# Patient Record
Sex: FEMALE | Race: WHITE | Employment: UNEMPLOYED | URBAN - METROPOLITAN AREA
[De-identification: names, ages, dates, MRNs, and addresses within clinical notes are randomized per-mention and may not be internally consistent; named-entity substitution may affect disease eponyms.]

---

## 2017-02-14 ENCOUNTER — TELEPHONE (OUTPATIENT)
Dept: INTERNAL MEDICINE CLINIC | Facility: CLINIC | Age: 57
End: 2017-02-14

## 2017-02-14 DIAGNOSIS — Z13.228 SCREENING FOR ENDOCRINE, METABOLIC AND IMMUNITY DISORDER: ICD-10-CM

## 2017-02-14 DIAGNOSIS — Z13.0 SCREENING FOR DISORDER OF BLOOD AND BLOOD-FORMING ORGANS: Primary | ICD-10-CM

## 2017-02-14 DIAGNOSIS — Z13.0 SCREENING FOR ENDOCRINE, METABOLIC AND IMMUNITY DISORDER: ICD-10-CM

## 2017-02-14 DIAGNOSIS — Z13.29 SCREENING FOR ENDOCRINE, METABOLIC AND IMMUNITY DISORDER: ICD-10-CM

## 2017-02-14 DIAGNOSIS — Z13.220 SCREENING FOR LIPID DISORDERS: ICD-10-CM

## 2017-02-14 DIAGNOSIS — Z13.29 SCREENING FOR THYROID DISORDER: ICD-10-CM

## 2017-02-17 RX ORDER — LOSARTAN POTASSIUM AND HYDROCHLOROTHIAZIDE 12.5; 5 MG/1; MG/1
TABLET ORAL
Qty: 90 TABLET | Refills: 0 | Status: SHIPPED | OUTPATIENT
Start: 2017-02-17 | End: 2017-05-22

## 2017-02-20 ENCOUNTER — LAB ENCOUNTER (OUTPATIENT)
Dept: LAB | Age: 57
End: 2017-02-20
Attending: NURSE PRACTITIONER
Payer: COMMERCIAL

## 2017-02-20 DIAGNOSIS — Z13.0 SCREENING FOR ENDOCRINE, METABOLIC AND IMMUNITY DISORDER: ICD-10-CM

## 2017-02-20 DIAGNOSIS — Z13.29 SCREENING FOR THYROID DISORDER: ICD-10-CM

## 2017-02-20 DIAGNOSIS — Z13.220 SCREENING FOR LIPID DISORDERS: ICD-10-CM

## 2017-02-20 DIAGNOSIS — Z13.29 SCREENING FOR ENDOCRINE, METABOLIC AND IMMUNITY DISORDER: ICD-10-CM

## 2017-02-20 DIAGNOSIS — Z13.0 SCREENING FOR DISORDER OF BLOOD AND BLOOD-FORMING ORGANS: ICD-10-CM

## 2017-02-20 DIAGNOSIS — Z13.228 SCREENING FOR ENDOCRINE, METABOLIC AND IMMUNITY DISORDER: ICD-10-CM

## 2017-02-20 LAB
ALBUMIN SERPL-MCNC: 3.6 G/DL (ref 3.5–4.8)
ALP LIVER SERPL-CCNC: 59 U/L (ref 46–118)
ALT SERPL-CCNC: 28 U/L (ref 14–54)
AST SERPL-CCNC: 27 U/L (ref 15–41)
BASOPHILS # BLD AUTO: 0.06 X10(3) UL (ref 0–0.1)
BASOPHILS NFR BLD AUTO: 1.1 %
BILIRUB SERPL-MCNC: 0.6 MG/DL (ref 0.1–2)
BUN BLD-MCNC: 15 MG/DL (ref 8–20)
CALCIUM BLD-MCNC: 8.5 MG/DL (ref 8.3–10.3)
CHLORIDE: 106 MMOL/L (ref 101–111)
CHOLEST SMN-MCNC: 151 MG/DL (ref ?–200)
CO2: 29 MMOL/L (ref 22–32)
CREAT BLD-MCNC: 0.81 MG/DL (ref 0.55–1.02)
EOSINOPHIL # BLD AUTO: 0.09 X10(3) UL (ref 0–0.3)
EOSINOPHIL NFR BLD AUTO: 1.7 %
ERYTHROCYTE [DISTWIDTH] IN BLOOD BY AUTOMATED COUNT: 12.4 % (ref 11.5–16)
GLUCOSE BLD-MCNC: 96 MG/DL (ref 70–99)
HCT VFR BLD AUTO: 41.3 % (ref 34–50)
HDLC SERPL-MCNC: 56 MG/DL (ref 45–?)
HDLC SERPL: 2.7 {RATIO} (ref ?–4.44)
HGB BLD-MCNC: 14.7 G/DL (ref 12–16)
IMMATURE GRANULOCYTE COUNT: 0.01 X10(3) UL (ref 0–1)
IMMATURE GRANULOCYTE RATIO %: 0.2 %
LDLC SERPL CALC-MCNC: 63 MG/DL (ref ?–130)
LYMPHOCYTES # BLD AUTO: 1.61 X10(3) UL (ref 0.9–4)
LYMPHOCYTES NFR BLD AUTO: 29.7 %
M PROTEIN MFR SERPL ELPH: 7 G/DL (ref 6.1–8.3)
MCH RBC QN AUTO: 30.9 PG (ref 27–33.2)
MCHC RBC AUTO-ENTMCNC: 35.6 G/DL (ref 31–37)
MCV RBC AUTO: 86.9 FL (ref 81–100)
MONOCYTES # BLD AUTO: 0.42 X10(3) UL (ref 0.1–0.6)
MONOCYTES NFR BLD AUTO: 7.7 %
NEUTROPHIL ABS PRELIM: 3.23 X10 (3) UL (ref 1.3–6.7)
NEUTROPHILS # BLD AUTO: 3.23 X10(3) UL (ref 1.3–6.7)
NEUTROPHILS NFR BLD AUTO: 59.6 %
NONHDLC SERPL-MCNC: 95 MG/DL (ref ?–130)
PLATELET # BLD AUTO: 218 10(3)UL (ref 150–450)
POTASSIUM SERPL-SCNC: 4 MMOL/L (ref 3.6–5.1)
RBC # BLD AUTO: 4.75 X10(6)UL (ref 3.8–5.1)
RED CELL DISTRIBUTION WIDTH-SD: 39.1 FL (ref 35.1–46.3)
SODIUM SERPL-SCNC: 142 MMOL/L (ref 136–144)
TRIGLYCERIDES: 161 MG/DL (ref ?–150)
TSI SER-ACNC: 1.29 MIU/ML (ref 0.35–5.5)
VLDL: 32 MG/DL (ref 5–40)
WBC # BLD AUTO: 5.4 X10(3) UL (ref 4–13)

## 2017-02-20 PROCEDURE — 80053 COMPREHEN METABOLIC PANEL: CPT

## 2017-02-20 PROCEDURE — 85025 COMPLETE CBC W/AUTO DIFF WBC: CPT

## 2017-02-20 PROCEDURE — 80061 LIPID PANEL: CPT

## 2017-02-20 PROCEDURE — 36415 COLL VENOUS BLD VENIPUNCTURE: CPT

## 2017-02-20 PROCEDURE — 84443 ASSAY THYROID STIM HORMONE: CPT

## 2017-02-28 ENCOUNTER — OFFICE VISIT (OUTPATIENT)
Dept: INTERNAL MEDICINE CLINIC | Facility: CLINIC | Age: 57
End: 2017-02-28

## 2017-02-28 VITALS
HEIGHT: 67 IN | BODY MASS INDEX: 33.43 KG/M2 | WEIGHT: 213 LBS | SYSTOLIC BLOOD PRESSURE: 126 MMHG | DIASTOLIC BLOOD PRESSURE: 80 MMHG | HEART RATE: 76 BPM | TEMPERATURE: 98 F | RESPIRATION RATE: 17 BRPM

## 2017-02-28 DIAGNOSIS — Z00.00 ROUTINE GENERAL MEDICAL EXAMINATION AT A HEALTH CARE FACILITY: Primary | ICD-10-CM

## 2017-02-28 DIAGNOSIS — M25.551 RIGHT HIP PAIN: ICD-10-CM

## 2017-02-28 DIAGNOSIS — I10 ESSENTIAL HYPERTENSION: ICD-10-CM

## 2017-02-28 PROCEDURE — 99396 PREV VISIT EST AGE 40-64: CPT | Performed by: NURSE PRACTITIONER

## 2017-02-28 PROCEDURE — 90715 TDAP VACCINE 7 YRS/> IM: CPT | Performed by: NURSE PRACTITIONER

## 2017-02-28 PROCEDURE — 90471 IMMUNIZATION ADMIN: CPT | Performed by: NURSE PRACTITIONER

## 2017-02-28 RX ORDER — MINOCYCLINE HYDROCHLORIDE 100 MG/1
100 CAPSULE ORAL 2 TIMES DAILY
Refills: 5 | COMMUNITY
Start: 2017-02-14 | End: 2018-03-05

## 2017-02-28 RX ORDER — NAPROXEN 500 MG/1
500 TABLET ORAL 2 TIMES DAILY
Qty: 60 TABLET | Refills: 0 | Status: SHIPPED | OUTPATIENT
Start: 2017-02-28 | End: 2017-06-19 | Stop reason: ALTCHOICE

## 2017-02-28 NOTE — PROGRESS NOTES
Jo Ann Crowe is a 64year old female who presents for a complete physical exam:       Patient complains of:    HTN   bp to goal.  Losartan hct 50/12.5mg. She does note some increased frequency  She defers changing her med as her bp is controlled. PEA SIZE AMOUNT ON SKIN UTD NIGHTLY Disp:  Rfl: 6      Past Medical History   Diagnosis Date   • Cough    • Acute pharyngitis    • Personal history of pneumonia (recurrent)    • Unspecified essential hypertension    • High blood pressure           Past Maryam Jones      History of dysplasia:    Menstrual Cycle:          LMP:   Symptoms:   Sexually Active:  Yes   Mammogram: aug  Bone Density:      Osteoperosis Prevention:    Osteoperosis Prevention was discussed.   Reviewed Calcium, Vitamin D supplementation and health care facility  (primary encounter diagnosis)  Essential hypertension   Cont losartan hct      Orders Placed This Encounter  TdaP (Adacel, Boostrix) (05737) (DX V06.1/Z23)    Meds & Refills for this Visit:  Signed Prescriptions Disp Refills    naprox

## 2017-03-22 PROBLEM — M25.551 HIP PAIN, ACUTE, RIGHT: Status: ACTIVE | Noted: 2017-03-22

## 2017-05-22 RX ORDER — LOSARTAN POTASSIUM AND HYDROCHLOROTHIAZIDE 12.5; 5 MG/1; MG/1
TABLET ORAL
Qty: 90 TABLET | Refills: 0 | Status: SHIPPED | OUTPATIENT
Start: 2017-05-22 | End: 2017-08-23

## 2017-06-19 ENCOUNTER — OFFICE VISIT (OUTPATIENT)
Dept: INTERNAL MEDICINE CLINIC | Facility: CLINIC | Age: 57
End: 2017-06-19

## 2017-06-19 VITALS
DIASTOLIC BLOOD PRESSURE: 82 MMHG | SYSTOLIC BLOOD PRESSURE: 134 MMHG | BODY MASS INDEX: 33.27 KG/M2 | RESPIRATION RATE: 16 BRPM | TEMPERATURE: 98 F | HEIGHT: 67 IN | HEART RATE: 72 BPM | WEIGHT: 212 LBS

## 2017-06-19 DIAGNOSIS — J06.9 ACUTE URI: Primary | ICD-10-CM

## 2017-06-19 PROCEDURE — 99213 OFFICE O/P EST LOW 20 MIN: CPT | Performed by: NURSE PRACTITIONER

## 2017-06-19 RX ORDER — AMOXICILLIN AND CLAVULANATE POTASSIUM 875; 125 MG/1; MG/1
1 TABLET, FILM COATED ORAL 2 TIMES DAILY
Qty: 20 TABLET | Refills: 0 | Status: SHIPPED | OUTPATIENT
Start: 2017-06-19 | End: 2017-06-29

## 2017-06-19 NOTE — PATIENT INSTRUCTIONS
Gargle with warm salt water solution 3-5 times daily. Dissolve 1/2 teaspoon salt in half cup of warm tap water. Gargle and spit. Use a humidifier in your room at night.      I highly recommend using a saline nasal wash device such as: SinuCleanse Nasal

## 2017-06-19 NOTE — PROGRESS NOTES
Patient presents with:  Cough/URI: x 4 days- Pt states she has non productive cough w/ chills- pt denies any NVD       HPI:  Presents with approx 1 week history of cough without production, sore throat, sinus congestion, head congestion, nasal drainage and rhonchi or rales   Skin: Skin is warm and dry. No rash noted. No erythema. No pallor. A/P:    Acute uri  (primary encounter diagnosis)- Augmentin. Nasal Sinus saline rinses, warm salt water gargles.  Supportive care-increased fluids/rest    Instructed

## 2017-08-23 RX ORDER — LOSARTAN POTASSIUM AND HYDROCHLOROTHIAZIDE 12.5; 5 MG/1; MG/1
TABLET ORAL
Qty: 90 TABLET | Refills: 0 | Status: SHIPPED | OUTPATIENT
Start: 2017-08-23 | End: 2017-11-27

## 2017-11-27 RX ORDER — LOSARTAN POTASSIUM AND HYDROCHLOROTHIAZIDE 12.5; 5 MG/1; MG/1
TABLET ORAL
Qty: 90 TABLET | Refills: 0 | Status: SHIPPED | OUTPATIENT
Start: 2017-11-27 | End: 2018-02-19

## 2017-11-29 ENCOUNTER — OFFICE VISIT (OUTPATIENT)
Dept: INTERNAL MEDICINE CLINIC | Facility: CLINIC | Age: 57
End: 2017-11-29

## 2017-11-29 VITALS
BODY MASS INDEX: 34.84 KG/M2 | TEMPERATURE: 98 F | DIASTOLIC BLOOD PRESSURE: 78 MMHG | WEIGHT: 222 LBS | HEART RATE: 82 BPM | HEIGHT: 67 IN | OXYGEN SATURATION: 99 % | SYSTOLIC BLOOD PRESSURE: 120 MMHG | RESPIRATION RATE: 16 BRPM

## 2017-11-29 DIAGNOSIS — J02.9 PHARYNGITIS, UNSPECIFIED ETIOLOGY: ICD-10-CM

## 2017-11-29 DIAGNOSIS — J06.9 ACUTE URI: Primary | ICD-10-CM

## 2017-11-29 PROCEDURE — 99213 OFFICE O/P EST LOW 20 MIN: CPT | Performed by: NURSE PRACTITIONER

## 2017-11-29 RX ORDER — AMOXICILLIN AND CLAVULANATE POTASSIUM 875; 125 MG/1; MG/1
1 TABLET, FILM COATED ORAL 2 TIMES DAILY
Qty: 20 TABLET | Refills: 0 | Status: SHIPPED | OUTPATIENT
Start: 2017-11-29 | End: 2017-12-09

## 2017-11-29 RX ORDER — FLUTICASONE PROPIONATE 50 MCG
1 SPRAY, SUSPENSION (ML) NASAL 2 TIMES DAILY
Qty: 1 BOTTLE | Refills: 1 | Status: SHIPPED | OUTPATIENT
Start: 2017-11-29

## 2017-11-29 NOTE — PROGRESS NOTES
Marika Purcell is a 62year old female. Patient presents with:  Sinus Problem: exam rm 11  Sore Throat      HPI:   Presents with swollen throat and sinus congestion. Started Sunday with throat fullness. Woke from sleep. No SOB.   Shellfish allergy but with exertion, no cough  CARDIOVASCULAR: denies chest pain on exertion, no palpatations  GI: denies abdominal pain and denies heartburn, no diarrhea or constipation  MUSCULOSKELETAL:  No arthralgias or myalgias  NEURO: denies headaches,     EXAM:   /

## 2018-02-07 ENCOUNTER — TELEPHONE (OUTPATIENT)
Dept: INTERNAL MEDICINE CLINIC | Facility: CLINIC | Age: 58
End: 2018-02-07

## 2018-02-07 DIAGNOSIS — Z00.00 LABORATORY EXAMINATION ORDERED AS PART OF A COMPLETE PHYSICAL EXAMINATION: Primary | ICD-10-CM

## 2018-02-19 RX ORDER — LOSARTAN POTASSIUM AND HYDROCHLOROTHIAZIDE 12.5; 5 MG/1; MG/1
TABLET ORAL
Qty: 90 TABLET | Refills: 0 | Status: SHIPPED | OUTPATIENT
Start: 2018-02-19 | End: 2018-03-05

## 2018-02-26 ENCOUNTER — LAB ENCOUNTER (OUTPATIENT)
Dept: LAB | Age: 58
End: 2018-02-26
Attending: NURSE PRACTITIONER
Payer: COMMERCIAL

## 2018-02-26 DIAGNOSIS — Z00.00 LABORATORY EXAMINATION ORDERED AS PART OF A COMPLETE PHYSICAL EXAMINATION: ICD-10-CM

## 2018-02-26 LAB
ALBUMIN SERPL-MCNC: 3.6 G/DL (ref 3.5–4.8)
ALP LIVER SERPL-CCNC: 62 U/L (ref 46–118)
ALT SERPL-CCNC: 33 U/L (ref 14–54)
AST SERPL-CCNC: 23 U/L (ref 15–41)
BASOPHILS # BLD AUTO: 0.05 X10(3) UL (ref 0–0.1)
BASOPHILS NFR BLD AUTO: 1 %
BILIRUB SERPL-MCNC: 0.6 MG/DL (ref 0.1–2)
BUN BLD-MCNC: 11 MG/DL (ref 8–20)
CALCIUM BLD-MCNC: 8.9 MG/DL (ref 8.3–10.3)
CHLORIDE: 104 MMOL/L (ref 101–111)
CHOLEST SMN-MCNC: 153 MG/DL (ref ?–200)
CO2: 29 MMOL/L (ref 22–32)
CREAT BLD-MCNC: 0.83 MG/DL (ref 0.55–1.02)
EOSINOPHIL # BLD AUTO: 0.11 X10(3) UL (ref 0–0.3)
EOSINOPHIL NFR BLD AUTO: 2.2 %
ERYTHROCYTE [DISTWIDTH] IN BLOOD BY AUTOMATED COUNT: 12.8 % (ref 11.5–16)
GLUCOSE BLD-MCNC: 99 MG/DL (ref 70–99)
HCT VFR BLD AUTO: 41.6 % (ref 34–50)
HDLC SERPL-MCNC: 45 MG/DL (ref 45–?)
HDLC SERPL: 3.4 {RATIO} (ref ?–4.44)
HGB BLD-MCNC: 14.1 G/DL (ref 12–16)
IMMATURE GRANULOCYTE COUNT: 0.01 X10(3) UL (ref 0–1)
IMMATURE GRANULOCYTE RATIO %: 0.2 %
LDLC SERPL CALC-MCNC: 77 MG/DL (ref ?–130)
LYMPHOCYTES # BLD AUTO: 1.24 X10(3) UL (ref 0.9–4)
LYMPHOCYTES NFR BLD AUTO: 25.1 %
M PROTEIN MFR SERPL ELPH: 7 G/DL (ref 6.1–8.3)
MCH RBC QN AUTO: 29.6 PG (ref 27–33.2)
MCHC RBC AUTO-ENTMCNC: 33.9 G/DL (ref 31–37)
MCV RBC AUTO: 87.4 FL (ref 81–100)
MONOCYTES # BLD AUTO: 0.41 X10(3) UL (ref 0.1–1)
MONOCYTES NFR BLD AUTO: 8.3 %
NEUTROPHIL ABS PRELIM: 3.12 X10 (3) UL (ref 1.3–6.7)
NEUTROPHILS # BLD AUTO: 3.12 X10(3) UL (ref 1.3–6.7)
NEUTROPHILS NFR BLD AUTO: 63.2 %
NONHDLC SERPL-MCNC: 108 MG/DL (ref ?–130)
PLATELET # BLD AUTO: 217 10(3)UL (ref 150–450)
POTASSIUM SERPL-SCNC: 4.3 MMOL/L (ref 3.6–5.1)
RBC # BLD AUTO: 4.76 X10(6)UL (ref 3.8–5.1)
RED CELL DISTRIBUTION WIDTH-SD: 40.7 FL (ref 35.1–46.3)
SODIUM SERPL-SCNC: 141 MMOL/L (ref 136–144)
TRIGL SERPL-MCNC: 156 MG/DL (ref ?–150)
TSI SER-ACNC: 1.32 MIU/ML (ref 0.35–5.5)
VLDLC SERPL CALC-MCNC: 31 MG/DL (ref 5–40)
WBC # BLD AUTO: 4.9 X10(3) UL (ref 4–13)

## 2018-02-26 PROCEDURE — 80061 LIPID PANEL: CPT | Performed by: NURSE PRACTITIONER

## 2018-02-26 PROCEDURE — 80050 GENERAL HEALTH PANEL: CPT | Performed by: NURSE PRACTITIONER

## 2018-03-05 ENCOUNTER — OFFICE VISIT (OUTPATIENT)
Dept: INTERNAL MEDICINE CLINIC | Facility: CLINIC | Age: 58
End: 2018-03-05

## 2018-03-05 VITALS
TEMPERATURE: 98 F | HEART RATE: 80 BPM | WEIGHT: 217 LBS | RESPIRATION RATE: 16 BRPM | DIASTOLIC BLOOD PRESSURE: 70 MMHG | SYSTOLIC BLOOD PRESSURE: 124 MMHG | HEIGHT: 67 IN | BODY MASS INDEX: 34.06 KG/M2

## 2018-03-05 DIAGNOSIS — I10 ESSENTIAL HYPERTENSION: ICD-10-CM

## 2018-03-05 DIAGNOSIS — E04.2 MULTIPLE THYROID NODULES: ICD-10-CM

## 2018-03-05 DIAGNOSIS — L71.9 ROSACEA: ICD-10-CM

## 2018-03-05 DIAGNOSIS — R35.0 URINARY FREQUENCY: ICD-10-CM

## 2018-03-05 DIAGNOSIS — M62.89 PELVIC FLOOR DYSFUNCTION: ICD-10-CM

## 2018-03-05 DIAGNOSIS — Z00.00 ROUTINE GENERAL MEDICAL EXAMINATION AT A HEALTH CARE FACILITY: Primary | ICD-10-CM

## 2018-03-05 LAB
APPEARANCE: CLEAR
BILIRUBIN: NEGATIVE
GLUCOSE (URINE DIPSTICK): NEGATIVE MG/DL
LEUKOCYTES: NEGATIVE
MULTISTIX LOT#: NORMAL NUMERIC
NITRITE, URINE: NEGATIVE
OCCULT BLOOD: NEGATIVE
PH, URINE: 6.5 (ref 4.5–8)
PROTEIN (URINE DIPSTICK): NEGATIVE MG/DL
SPECIFIC GRAVITY: 1.02 (ref 1–1.03)
URINE-COLOR: YELLOW
UROBILINOGEN,SEMI-QN: 0.2 MG/DL (ref 0–1.9)

## 2018-03-05 PROCEDURE — 81003 URINALYSIS AUTO W/O SCOPE: CPT | Performed by: NURSE PRACTITIONER

## 2018-03-05 PROCEDURE — 99396 PREV VISIT EST AGE 40-64: CPT | Performed by: NURSE PRACTITIONER

## 2018-03-05 RX ORDER — LOSARTAN POTASSIUM AND HYDROCHLOROTHIAZIDE 12.5; 5 MG/1; MG/1
1 TABLET ORAL
Qty: 90 TABLET | Refills: 3 | Status: SHIPPED | OUTPATIENT
Start: 2018-03-05 | End: 2018-06-26

## 2018-03-05 RX ORDER — MINOCYCLINE HYDROCHLORIDE 100 MG/1
100 CAPSULE ORAL 2 TIMES DAILY
Qty: 180 CAPSULE | Refills: 3 | Status: SHIPPED | OUTPATIENT
Start: 2018-03-05 | End: 2019-06-12

## 2018-03-05 NOTE — PROGRESS NOTES
Adalberto Dance is a 62year old female who presents for a complete physical exam:       Patient complains of:    HTN  Losartan HCT  Stable bp  Labs reviewed. Rosacea:  Minocycline per derm   She is requesting refill.        Urinary frequency   Worseni Past Surgical History:  7/11/2017: COLONOSCOPY N/A      Comment: Procedure: COLONOSCOPY, POSSIBLE BIOPSY,                POSSIBLE POLYPECTOMY 69023;  Surgeon: Jeet Lane MD;  Location: [address] [place] [address]  7/27/ History of dysplasia:      Mammogram: done   Bone Density:  Per Dr Robbie De Paz Prevention:    Osteoperosis Prevention was discussed. Reviewed Calcium, Vitamin D supplementation and Weight Bearing Exercises.     Patient is performing weight beari ordered. No orders of the defined types were placed in this encounter. Meds & Refills for this Visit:  Signed Prescriptions Disp Refills    Losartan Potassium-HCTZ 50-12.5 MG Oral Tab 90 tablet 3      Sig: Take 1 tablet by mouth once daily.

## 2018-06-18 ENCOUNTER — TELEPHONE (OUTPATIENT)
Dept: INTERNAL MEDICINE CLINIC | Facility: CLINIC | Age: 58
End: 2018-06-18

## 2018-06-18 NOTE — TELEPHONE ENCOUNTER
Patient has been having head since Saturday. This somewhat went away and then today came back with some severity. Patient was doing things around the house and had to lay down because it pretty bad.   Please advise

## 2018-06-18 NOTE — TELEPHONE ENCOUNTER
Pt stating since Saturday experiencing \"head pain\". Has healed cut on forehead that has scabbed over. No known head injury or fall. Keeping hydrated. Subsided yesterday and returned today. No N/V. No dizziness. No vision changes. No eye pain.  No chest pa

## 2018-06-19 ENCOUNTER — OFFICE VISIT (OUTPATIENT)
Dept: INTERNAL MEDICINE CLINIC | Facility: CLINIC | Age: 58
End: 2018-06-19

## 2018-06-19 ENCOUNTER — HOSPITAL ENCOUNTER (OUTPATIENT)
Dept: CT IMAGING | Age: 58
Discharge: HOME | End: 2018-06-19
Attending: NURSE PRACTITIONER
Payer: COMMERCIAL

## 2018-06-19 ENCOUNTER — APPOINTMENT (OUTPATIENT)
Dept: LAB | Age: 58
End: 2018-06-19
Attending: NURSE PRACTITIONER
Payer: COMMERCIAL

## 2018-06-19 VITALS
BODY MASS INDEX: 35 KG/M2 | TEMPERATURE: 98 F | RESPIRATION RATE: 14 BRPM | WEIGHT: 223 LBS | HEART RATE: 68 BPM | SYSTOLIC BLOOD PRESSURE: 140 MMHG | HEIGHT: 67 IN | DIASTOLIC BLOOD PRESSURE: 88 MMHG

## 2018-06-19 DIAGNOSIS — I10 ESSENTIAL HYPERTENSION: ICD-10-CM

## 2018-06-19 DIAGNOSIS — R51.9 NEW ONSET HEADACHE: ICD-10-CM

## 2018-06-19 DIAGNOSIS — R51.9 NEW ONSET HEADACHE: Primary | ICD-10-CM

## 2018-06-19 PROBLEM — G40.909 SEIZURE DISORDER (HCC): Status: ACTIVE | Noted: 2018-06-19

## 2018-06-19 PROBLEM — G43.909 MIGRAINES: Status: ACTIVE | Noted: 2018-06-19

## 2018-06-19 PROCEDURE — 70450 CT HEAD/BRAIN W/O DYE: CPT | Performed by: NURSE PRACTITIONER

## 2018-06-19 PROCEDURE — 99214 OFFICE O/P EST MOD 30 MIN: CPT | Performed by: NURSE PRACTITIONER

## 2018-06-19 PROCEDURE — 36415 COLL VENOUS BLD VENIPUNCTURE: CPT | Performed by: NURSE PRACTITIONER

## 2018-06-19 PROCEDURE — 85652 RBC SED RATE AUTOMATED: CPT | Performed by: NURSE PRACTITIONER

## 2018-06-19 PROCEDURE — 86140 C-REACTIVE PROTEIN: CPT | Performed by: NURSE PRACTITIONER

## 2018-06-19 PROCEDURE — 80053 COMPREHEN METABOLIC PANEL: CPT | Performed by: NURSE PRACTITIONER

## 2018-06-19 RX ORDER — HYDROCODONE BITARTRATE AND ACETAMINOPHEN 5; 325 MG/1; MG/1
1 TABLET ORAL 3 TIMES DAILY PRN
Qty: 20 TABLET | Refills: 0 | Status: SHIPPED | OUTPATIENT
Start: 2018-06-19 | End: 2018-06-26

## 2018-06-19 RX ORDER — LOSARTAN POTASSIUM 50 MG/1
50 TABLET ORAL DAILY
Qty: 30 TABLET | Refills: 0 | Status: SHIPPED | OUTPATIENT
Start: 2018-06-19 | End: 2018-06-26

## 2018-06-19 NOTE — PROGRESS NOTES
Jo Ann Crowe is a 62year old female. Patient presents with: Other: AB RM 10 Patient states having right side head pain on and off X 4 days, not due to injury       HPI:     Presents for eval of stabbing pain right side of head.    Started Saturday am. Alcohol use: Yes           0.0 - 0.6 oz/week     Standard drinks or equivalent: 0 - 1 per week     Comment: Cage done 6/19/2018    Family History   Problem Relation Age of Onset   • Cancer Mother      breast   • Breast Cancer Mother 72   • Heart Disease HYDROcodone-acetaminophen (NORCO) 5-325 MG Oral Tab 20 tablet 0      Sig: Take 1 tablet by mouth 3 (three) times daily as needed for Pain. Imaging & Consults:  CT BRAIN OR HEAD (29187)    No Follow-up on file.   There are no Patient Instructions o

## 2018-06-26 ENCOUNTER — OFFICE VISIT (OUTPATIENT)
Dept: INTERNAL MEDICINE CLINIC | Facility: CLINIC | Age: 58
End: 2018-06-26

## 2018-06-26 VITALS
TEMPERATURE: 98 F | DIASTOLIC BLOOD PRESSURE: 80 MMHG | RESPIRATION RATE: 12 BRPM | BODY MASS INDEX: 34.44 KG/M2 | SYSTOLIC BLOOD PRESSURE: 126 MMHG | HEART RATE: 72 BPM | WEIGHT: 222 LBS | HEIGHT: 67.5 IN

## 2018-06-26 DIAGNOSIS — I10 ESSENTIAL HYPERTENSION: ICD-10-CM

## 2018-06-26 DIAGNOSIS — R51.9 NEW ONSET HEADACHE: Primary | ICD-10-CM

## 2018-06-26 PROBLEM — G40.909 SEIZURE DISORDER (HCC): Status: RESOLVED | Noted: 2018-06-19 | Resolved: 2018-06-26

## 2018-06-26 PROCEDURE — 99213 OFFICE O/P EST LOW 20 MIN: CPT | Performed by: NURSE PRACTITIONER

## 2018-06-26 RX ORDER — LOSARTAN POTASSIUM AND HYDROCHLOROTHIAZIDE 12.5; 1 MG/1; MG/1
1 TABLET ORAL DAILY
Qty: 90 TABLET | Refills: 2 | Status: SHIPPED | OUTPATIENT
Start: 2018-06-26 | End: 2019-05-29

## 2018-06-26 NOTE — PROGRESS NOTES
Sofie Sullivan is a 62year old female. Patient presents with: Follow - Up: on headaches and BP. LB-room 11      HPI:   Here for follow up headaches and bp. Seen last week. Stabbing headaches. bp borderline high. Stat CT brain negative.    Her headac HEALTH: feels well otherwise  RESPIRATORY: denies shortness of breath with exertion, no cough  CARDIOVASCULAR: denies chest pain on exertion, no palpatations  GI: denies abdominal pain and denies heartburn, no diarrhea or constipation  MUSCULOSKELETAL:  No

## 2018-12-04 PROCEDURE — 87624 HPV HI-RISK TYP POOLED RSLT: CPT | Performed by: OBSTETRICS & GYNECOLOGY

## 2018-12-04 PROCEDURE — 88175 CYTOPATH C/V AUTO FLUID REDO: CPT | Performed by: OBSTETRICS & GYNECOLOGY

## 2019-05-10 ENCOUNTER — TELEPHONE (OUTPATIENT)
Dept: INTERNAL MEDICINE CLINIC | Facility: CLINIC | Age: 59
End: 2019-05-10

## 2019-05-10 DIAGNOSIS — Z13.29 SCREENING FOR THYROID DISORDER: ICD-10-CM

## 2019-05-10 DIAGNOSIS — Z13.0 SCREENING FOR DISORDER OF BLOOD AND BLOOD-FORMING ORGANS: ICD-10-CM

## 2019-05-10 DIAGNOSIS — Z13.220 SCREENING FOR LIPOID DISORDERS: ICD-10-CM

## 2019-05-10 DIAGNOSIS — Z13.228 SCREENING FOR METABOLIC DISORDER: ICD-10-CM

## 2019-05-10 DIAGNOSIS — Z00.00 ROUTINE GENERAL MEDICAL EXAMINATION AT A HEALTH CARE FACILITY: Primary | ICD-10-CM

## 2019-05-10 NOTE — TELEPHONE ENCOUNTER
Future Appointments   Date Time Provider Vibha Aurora   5/29/2019  7:30 AM Venkat Foy, APRN [facility] [facility]       Pt would like BW orders sent to [address].  Pt aware to fast.

## 2019-05-20 ENCOUNTER — LAB ENCOUNTER (OUTPATIENT)
Dept: LAB | Age: 59
End: 2019-05-20
Attending: NURSE PRACTITIONER
Payer: COMMERCIAL

## 2019-05-20 DIAGNOSIS — Z13.220 SCREENING FOR LIPOID DISORDERS: ICD-10-CM

## 2019-05-20 DIAGNOSIS — Z13.228 SCREENING FOR METABOLIC DISORDER: ICD-10-CM

## 2019-05-20 DIAGNOSIS — Z00.00 ROUTINE GENERAL MEDICAL EXAMINATION AT A HEALTH CARE FACILITY: ICD-10-CM

## 2019-05-20 DIAGNOSIS — Z13.29 SCREENING FOR THYROID DISORDER: ICD-10-CM

## 2019-05-20 DIAGNOSIS — Z13.0 SCREENING FOR DISORDER OF BLOOD AND BLOOD-FORMING ORGANS: ICD-10-CM

## 2019-05-20 PROCEDURE — 80050 GENERAL HEALTH PANEL: CPT | Performed by: NURSE PRACTITIONER

## 2019-05-20 PROCEDURE — 80061 LIPID PANEL: CPT | Performed by: NURSE PRACTITIONER

## 2019-05-29 ENCOUNTER — APPOINTMENT (OUTPATIENT)
Dept: LAB | Age: 59
End: 2019-05-29
Attending: NURSE PRACTITIONER
Payer: COMMERCIAL

## 2019-05-29 ENCOUNTER — OFFICE VISIT (OUTPATIENT)
Dept: INTERNAL MEDICINE CLINIC | Facility: CLINIC | Age: 59
End: 2019-05-29
Payer: COMMERCIAL

## 2019-05-29 VITALS
HEIGHT: 67.5 IN | TEMPERATURE: 98 F | SYSTOLIC BLOOD PRESSURE: 128 MMHG | BODY MASS INDEX: 32.89 KG/M2 | DIASTOLIC BLOOD PRESSURE: 78 MMHG | HEART RATE: 68 BPM | WEIGHT: 212 LBS | RESPIRATION RATE: 16 BRPM

## 2019-05-29 DIAGNOSIS — G43.809 OTHER MIGRAINE WITHOUT STATUS MIGRAINOSUS, NOT INTRACTABLE: ICD-10-CM

## 2019-05-29 DIAGNOSIS — R53.83 FATIGUE, UNSPECIFIED TYPE: ICD-10-CM

## 2019-05-29 DIAGNOSIS — Z00.00 ROUTINE GENERAL MEDICAL EXAMINATION AT A HEALTH CARE FACILITY: Primary | ICD-10-CM

## 2019-05-29 DIAGNOSIS — I10 ESSENTIAL HYPERTENSION: ICD-10-CM

## 2019-05-29 DIAGNOSIS — E04.2 MULTIPLE THYROID NODULES: ICD-10-CM

## 2019-05-29 PROBLEM — M25.551 HIP PAIN, ACUTE, RIGHT: Status: RESOLVED | Noted: 2017-03-22 | Resolved: 2019-05-29

## 2019-05-29 PROCEDURE — 82306 VITAMIN D 25 HYDROXY: CPT | Performed by: NURSE PRACTITIONER

## 2019-05-29 PROCEDURE — 82607 VITAMIN B-12: CPT | Performed by: NURSE PRACTITIONER

## 2019-05-29 PROCEDURE — 99396 PREV VISIT EST AGE 40-64: CPT | Performed by: NURSE PRACTITIONER

## 2019-05-29 RX ORDER — LOSARTAN POTASSIUM AND HYDROCHLOROTHIAZIDE 12.5; 1 MG/1; MG/1
1 TABLET ORAL DAILY
Qty: 90 TABLET | Refills: 3 | Status: SHIPPED | OUTPATIENT
Start: 2019-05-29 | End: 2020-06-03

## 2019-05-29 NOTE — PROGRESS NOTES
Eliott Apley is a 62year old female who presents for a complete physical exam:       Patient complains of:     Fatigue   TSH normal   Taking MVI for women over 50  Daily. HTN  BP stable   Losartan hct 100/12.5mg.   She will check at home to confirm COLONOSCOPY, POSSIBLE BIOPSY, POSSIBLE POLYPECTOMY 44836 N/A 7/11/2017    Performed by Austin Sen MD at [facility]   • [facility]  07/11/2017    normal   • ENDOMETR ABLATE, THERMAL  2005   • ESOPHAGOGASTRODUODENOSCOPY, CO c/o      EXAM:   /82   Pulse 68   Temp 97.9 °F (36.6 °C) (Oral)   Resp 16   Ht 67.5\"   Wt 212 lb   LMP 02/13/2018 (Approximate)   Breastfeeding? No   BMI 32.71 kg/m²   Body mass index is 32.71 kg/m².    GENERAL: well developed, well nourished,in no a

## 2019-06-13 RX ORDER — MINOCYCLINE HYDROCHLORIDE 100 MG/1
CAPSULE ORAL
Qty: 180 CAPSULE | Refills: 0 | Status: SHIPPED | OUTPATIENT
Start: 2019-06-13 | End: 2019-09-18 | Stop reason: ALTCHOICE

## 2019-06-13 NOTE — TELEPHONE ENCOUNTER
Last Office Visit: 5-29-19 with SD for cpe  Last Rx Filled: 3-5-18 180 caps with 3 refills   Last Labs: 5-29-19 vitamin D/vitamin B12. 5-20-19 cbc/cmp/lipid/tsh  Future Appointment: none    Per protocol to provider

## 2019-09-18 PROCEDURE — 87086 URINE CULTURE/COLONY COUNT: CPT | Performed by: OBSTETRICS & GYNECOLOGY

## 2019-09-23 PROCEDURE — 81015 MICROSCOPIC EXAM OF URINE: CPT | Performed by: PHYSICIAN ASSISTANT

## 2020-01-04 RX ORDER — MINOCYCLINE HYDROCHLORIDE 100 MG/1
CAPSULE ORAL
Qty: 180 CAPSULE | Refills: 0 | Status: SHIPPED | OUTPATIENT
Start: 2020-01-04

## 2020-01-04 NOTE — TELEPHONE ENCOUNTER
Therapy completed , should patient restart ?     Please advise,    LOV:5/29/19 SD  FOV:none on file   LAST RX:6/13/19 100 mg take 1 cap twice a day 180 caps 0 refills   LAST LABS:9/23/19 urine microscopic w/ reflex culture,urinalysis  PER PROTOCOL: to provi

## 2020-06-01 ENCOUNTER — TELEPHONE (OUTPATIENT)
Dept: INTERNAL MEDICINE CLINIC | Facility: CLINIC | Age: 60
End: 2020-06-01

## 2020-06-01 NOTE — TELEPHONE ENCOUNTER
Patient was scheduled with me 6/2/2020 but is Dr. Dwain Grant patient. Please call patient to change to Dr. Dwain Grant.

## 2020-06-03 ENCOUNTER — OFFICE VISIT (OUTPATIENT)
Dept: INTERNAL MEDICINE CLINIC | Facility: CLINIC | Age: 60
End: 2020-06-03
Payer: COMMERCIAL

## 2020-06-03 VITALS
WEIGHT: 217 LBS | TEMPERATURE: 97 F | BODY MASS INDEX: 33.66 KG/M2 | HEIGHT: 67.5 IN | DIASTOLIC BLOOD PRESSURE: 84 MMHG | SYSTOLIC BLOOD PRESSURE: 120 MMHG | RESPIRATION RATE: 16 BRPM | HEART RATE: 80 BPM

## 2020-06-03 DIAGNOSIS — R25.1 TREMOR: ICD-10-CM

## 2020-06-03 DIAGNOSIS — Z12.31 ENCOUNTER FOR SCREENING MAMMOGRAM FOR MALIGNANT NEOPLASM OF BREAST: ICD-10-CM

## 2020-06-03 DIAGNOSIS — E04.1 THYROID NODULE: ICD-10-CM

## 2020-06-03 DIAGNOSIS — Z13.1 SCREENING FOR DIABETES MELLITUS: ICD-10-CM

## 2020-06-03 DIAGNOSIS — Z13.0 SCREENING FOR BLOOD DISEASE: ICD-10-CM

## 2020-06-03 DIAGNOSIS — G43.809 OTHER MIGRAINE WITHOUT STATUS MIGRAINOSUS, NOT INTRACTABLE: ICD-10-CM

## 2020-06-03 DIAGNOSIS — Z13.29 SCREENING FOR THYROID DISORDER: ICD-10-CM

## 2020-06-03 DIAGNOSIS — I10 ESSENTIAL HYPERTENSION: Primary | ICD-10-CM

## 2020-06-03 DIAGNOSIS — E55.9 VITAMIN D DEFICIENCY: ICD-10-CM

## 2020-06-03 DIAGNOSIS — Z13.220 SCREENING, LIPID: ICD-10-CM

## 2020-06-03 PROCEDURE — 99214 OFFICE O/P EST MOD 30 MIN: CPT | Performed by: NURSE PRACTITIONER

## 2020-06-03 RX ORDER — LOSARTAN POTASSIUM AND HYDROCHLOROTHIAZIDE 12.5; 5 MG/1; MG/1
1 TABLET ORAL DAILY
Qty: 90 TABLET | Refills: 3 | Status: SHIPPED | OUTPATIENT
Start: 2020-06-03 | End: 2020-12-04

## 2020-06-03 NOTE — PROGRESS NOTES
Edd Sullivan is a 61year old female. Patient presents with:  Blood Pressure: SN Rm 11; notes lower BP level, 100's/60's, feels fatigued  Health Maintenance: mammogram due      HPI:   Here for bp check. Has been running low at home.   Started checking (recurrent)    • Unspecified essential hypertension       Social History:  Social History    Tobacco Use      Smoking status: Never Smoker      Smokeless tobacco: Never Used    Alcohol use:  Yes      Alcohol/week: 0.0 - 1.0 standard drinks      Comment: cag thyroid disorder  Screening for blood disease  Vitamin d deficiency  Check labs.    Thyroid nodule  Check US  Encounter for screening mammogram for malignant neoplasm of breast  Other migraine without status migrainosus, not intractable    Orders Placed Thi

## 2020-06-05 ENCOUNTER — LAB ENCOUNTER (OUTPATIENT)
Dept: LAB | Facility: HOSPITAL | Age: 60
End: 2020-06-05
Attending: NURSE PRACTITIONER
Payer: COMMERCIAL

## 2020-06-05 DIAGNOSIS — Z13.1 SCREENING FOR DIABETES MELLITUS: ICD-10-CM

## 2020-06-05 DIAGNOSIS — Z13.29 SCREENING FOR THYROID DISORDER: ICD-10-CM

## 2020-06-05 DIAGNOSIS — Z13.0 SCREENING FOR BLOOD DISEASE: ICD-10-CM

## 2020-06-05 DIAGNOSIS — R25.1 TREMOR: ICD-10-CM

## 2020-06-05 DIAGNOSIS — Z13.220 SCREENING, LIPID: ICD-10-CM

## 2020-06-05 DIAGNOSIS — E55.9 VITAMIN D DEFICIENCY: ICD-10-CM

## 2020-06-05 PROCEDURE — 84443 ASSAY THYROID STIM HORMONE: CPT

## 2020-06-05 PROCEDURE — 82607 VITAMIN B-12: CPT

## 2020-06-05 PROCEDURE — 80061 LIPID PANEL: CPT

## 2020-06-05 PROCEDURE — 36415 COLL VENOUS BLD VENIPUNCTURE: CPT

## 2020-06-05 PROCEDURE — 80053 COMPREHEN METABOLIC PANEL: CPT

## 2020-06-05 PROCEDURE — 85025 COMPLETE CBC W/AUTO DIFF WBC: CPT

## 2020-06-05 PROCEDURE — 82306 VITAMIN D 25 HYDROXY: CPT

## 2020-06-12 ENCOUNTER — VIRTUAL PHONE E/M (OUTPATIENT)
Dept: INTERNAL MEDICINE CLINIC | Facility: CLINIC | Age: 60
End: 2020-06-12
Payer: COMMERCIAL

## 2020-06-12 DIAGNOSIS — J01.10 ACUTE NON-RECURRENT FRONTAL SINUSITIS: Primary | ICD-10-CM

## 2020-06-12 PROCEDURE — 99213 OFFICE O/P EST LOW 20 MIN: CPT | Performed by: NURSE PRACTITIONER

## 2020-06-12 RX ORDER — AMOXICILLIN AND CLAVULANATE POTASSIUM 875; 125 MG/1; MG/1
1 TABLET, FILM COATED ORAL 2 TIMES DAILY
Qty: 20 TABLET | Refills: 0 | Status: SHIPPED | OUTPATIENT
Start: 2020-06-12 | End: 2020-06-22

## 2020-06-12 NOTE — PROGRESS NOTES
Due to COVID-19 ACTION PLAN, the patient's office visit was converted to an audio visit. This visit is conducted using audio only. The patient consents to this service.   The patient understands and accepts financial responsibility for any deductible, co- visit. Appropriate affect, alert and oriented x 3. No distress. No conversational dyspnea. Speaking in full sentences. Sounds comfortable.     ASSESSMENT AND PLAN:     Acute non-recurrent frontal sinusitis  (primary encounter diagnosis)  augmentin   Co

## 2020-11-13 ENCOUNTER — TELEMEDICINE (OUTPATIENT)
Dept: INTERNAL MEDICINE CLINIC | Facility: CLINIC | Age: 60
End: 2020-11-13
Payer: COMMERCIAL

## 2020-11-13 DIAGNOSIS — R05.9 COUGH: ICD-10-CM

## 2020-11-13 DIAGNOSIS — R09.81 SINUS CONGESTION: Primary | ICD-10-CM

## 2020-11-13 DIAGNOSIS — Z20.822 EXPOSURE TO COVID-19 VIRUS: ICD-10-CM

## 2020-11-13 PROCEDURE — 99213 OFFICE O/P EST LOW 20 MIN: CPT | Performed by: INTERNAL MEDICINE

## 2020-11-13 RX ORDER — AMOXICILLIN AND CLAVULANATE POTASSIUM 875; 125 MG/1; MG/1
1 TABLET, FILM COATED ORAL 2 TIMES DAILY
Qty: 20 TABLET | Refills: 0 | Status: SHIPPED | OUTPATIENT
Start: 2020-11-13 | End: 2020-11-23

## 2020-11-13 NOTE — PROGRESS NOTES
Due to COVID-19 ACTION PLAN, the patient's office visit was converted to a video visit with informed patient consent.    Time Spent:8 min    Subjective     HPI:   Sofie Sullivan is a 61year old female who presents for ST, mild cough, sinus congestion for billing visit was spent on reviewing labs, medications, radiology tests and decision making. Appropriate medical decision-making and tests are ordered as detailed in the plan of care above.

## 2020-11-17 ENCOUNTER — LAB ENCOUNTER (OUTPATIENT)
Dept: LAB | Age: 60
End: 2020-11-17
Attending: INTERNAL MEDICINE
Payer: COMMERCIAL

## 2020-11-17 DIAGNOSIS — Z20.822 EXPOSURE TO COVID-19 VIRUS: Primary | ICD-10-CM

## 2020-12-04 ENCOUNTER — TELEMEDICINE (OUTPATIENT)
Dept: INTERNAL MEDICINE CLINIC | Facility: CLINIC | Age: 60
End: 2020-12-04
Payer: COMMERCIAL

## 2020-12-04 DIAGNOSIS — J01.01 ACUTE RECURRENT MAXILLARY SINUSITIS: Primary | ICD-10-CM

## 2020-12-04 DIAGNOSIS — I10 ESSENTIAL HYPERTENSION: ICD-10-CM

## 2020-12-04 PROCEDURE — 99214 OFFICE O/P EST MOD 30 MIN: CPT | Performed by: NURSE PRACTITIONER

## 2020-12-04 RX ORDER — CEFDINIR 300 MG/1
300 CAPSULE ORAL 2 TIMES DAILY
Qty: 28 CAPSULE | Refills: 0 | Status: SHIPPED | OUTPATIENT
Start: 2020-12-04 | End: 2021-07-02

## 2020-12-04 RX ORDER — BENZONATATE 200 MG/1
200 CAPSULE ORAL 3 TIMES DAILY PRN
Qty: 60 CAPSULE | Refills: 1 | Status: SHIPPED | OUTPATIENT
Start: 2020-12-04 | End: 2021-07-02

## 2020-12-04 RX ORDER — LOSARTAN POTASSIUM AND HYDROCHLOROTHIAZIDE 12.5; 1 MG/1; MG/1
1 TABLET ORAL DAILY
Qty: 90 TABLET | Refills: 1 | Status: SHIPPED | OUTPATIENT
Start: 2020-12-04 | End: 2021-06-21

## 2020-12-04 NOTE — PROGRESS NOTES
Due to COVID-19 ACTION PLAN, the patient's office visit was converted to a video visit. This visit is conducted using video and audio. The patient consents to this service.   The patient understands and accepts financial responsibility for any deductible, Fluticasone Propionate 50 MCG/ACT Nasal Suspension 1 spray by Nasal route 2 (two) times daily.  1 Bottle 1              Allergies    Shellfish               NAUSEA AND VOMITING    REVIEW OF SYSTEMS:   GENERAL HEALTH: feels ill  RESPIRATORY: denies shortness physical exam could be performed. Every conscious effort was taken to allow for sufficient and adequate time. This billing visit was spent on reviewing labs, medications, radiology tests and decision making.   Appropriate medical decision-making and tests

## 2020-12-14 ENCOUNTER — TELEPHONE (OUTPATIENT)
Dept: INTERNAL MEDICINE CLINIC | Facility: CLINIC | Age: 60
End: 2020-12-14

## 2020-12-14 ENCOUNTER — HOSPITAL ENCOUNTER (OUTPATIENT)
Age: 60
Discharge: HOME | End: 2020-12-14
Payer: COMMERCIAL

## 2020-12-14 VITALS
RESPIRATION RATE: 16 BRPM | HEART RATE: 91 BPM | BODY MASS INDEX: 33 KG/M2 | WEIGHT: 216.94 LBS | OXYGEN SATURATION: 96 % | SYSTOLIC BLOOD PRESSURE: 149 MMHG | DIASTOLIC BLOOD PRESSURE: 90 MMHG

## 2020-12-14 DIAGNOSIS — S81.051A DOG BITE OF RIGHT KNEE, INITIAL ENCOUNTER: Primary | ICD-10-CM
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
## 2020-12-14 PROCEDURE — 99214 OFFICE O/P EST MOD 30 MIN: CPT | Performed by: PHYSICIAN ASSISTANT

## 2020-12-14 RX ORDER — AMOXICILLIN AND CLAVULANATE POTASSIUM 875; 125 MG/1; MG/1
1 TABLET, FILM COATED ORAL 2 TIMES DAILY
Qty: 20 TABLET | Refills: 0 | Status: SHIPPED | OUTPATIENT
Start: 2020-12-14 | End: 2020-12-24

## 2020-12-14 NOTE — ED PROVIDER NOTES
Patient Seen in: [facility] [address] [place] [address]      History   Patient presents with:  Laceration/Abrasion    Stated Complaint: dog bite/ right knee    HPI     CHIEF COMPLAINT: Dog bite right knee 3 days ago    HISTORY OF PRESENT ILLNESS:  Patient is a Alcohol/week: 0.0 - 1.0 standard drinks      Comment: cage 5/29/19    Drug use: No             Review of Systems    Positive for stated complaint: dog bite/ right knee  Other systems are as noted in HPI. Constitutional and vital signs reviewed.       A Patient was screened and evaluated during this visit. I determined, within reasonable clinical confidence and prior to discharge, that an emergency medical condition was not or was no longer present.   There was no indication for further evaluation, treat

## 2020-12-14 NOTE — TELEPHONE ENCOUNTER
Pt stated she got bit by dog and now believes it's infected. Didn't have an appt available for today, pt stated she will go to urgent care. Advised pt to follow up with us.

## 2021-01-21 ENCOUNTER — TELEMEDICINE (OUTPATIENT)
Dept: INTERNAL MEDICINE CLINIC | Facility: CLINIC | Age: 61
End: 2021-01-21
Payer: COMMERCIAL

## 2021-01-21 DIAGNOSIS — G44.52 NEW DAILY PERSISTENT HEADACHE: ICD-10-CM

## 2021-01-21 DIAGNOSIS — R11.2 NON-INTRACTABLE VOMITING WITH NAUSEA, UNSPECIFIED VOMITING TYPE: ICD-10-CM

## 2021-01-21 DIAGNOSIS — M79.10 MYALGIA: ICD-10-CM

## 2021-01-21 DIAGNOSIS — R11.0 NAUSEA: Primary | ICD-10-CM

## 2021-01-21 DIAGNOSIS — Z20.822 EXPOSURE TO COVID-19 VIRUS: ICD-10-CM

## 2021-01-21 PROCEDURE — 99213 OFFICE O/P EST LOW 20 MIN: CPT | Performed by: NURSE PRACTITIONER

## 2021-01-21 NOTE — PROGRESS NOTES
Due to COVID-19 ACTION PLAN, the patient's office visit was converted to a video visit. This visit is conducted using video and audio. The patient consents to this service.   The patient understands and accepts financial responsibility for any deductible, denies chest pain on exertion, no palpatations  GI: NV  MUSCULOSKELETAL:  myalgias  NEURO: headaches,       EXAM:   Limited exam as this is a video visit. Appropriate affect, alert and oriented x 3. No distress. No conversational dyspnea.   Speaking in f

## 2021-01-22 ENCOUNTER — LAB ENCOUNTER (OUTPATIENT)
Dept: LAB | Age: 61
End: 2021-01-22
Attending: NURSE PRACTITIONER
Payer: COMMERCIAL

## 2021-01-22 DIAGNOSIS — R11.2 NON-INTRACTABLE VOMITING WITH NAUSEA, UNSPECIFIED VOMITING TYPE: ICD-10-CM

## 2021-01-22 DIAGNOSIS — Z20.822 EXPOSURE TO COVID-19 VIRUS: ICD-10-CM

## 2021-01-22 DIAGNOSIS — G44.52 NEW DAILY PERSISTENT HEADACHE: ICD-10-CM

## 2021-01-22 DIAGNOSIS — R11.0 NAUSEA: ICD-10-CM

## 2021-01-22 DIAGNOSIS — M79.10 MYALGIA: ICD-10-CM

## 2021-01-23 LAB — SARS-COV-2 RNA RESP QL NAA+PROBE: NOT DETECTED

## 2021-01-25 ENCOUNTER — TELEMEDICINE (OUTPATIENT)
Dept: INTERNAL MEDICINE CLINIC | Facility: CLINIC | Age: 61
End: 2021-01-25
Payer: COMMERCIAL

## 2021-01-25 DIAGNOSIS — I10 ESSENTIAL HYPERTENSION: Primary | ICD-10-CM

## 2021-01-25 DIAGNOSIS — R11.0 NAUSEA: ICD-10-CM

## 2021-01-25 PROCEDURE — 99213 OFFICE O/P EST LOW 20 MIN: CPT | Performed by: NURSE PRACTITIONER

## 2021-01-25 NOTE — PROGRESS NOTES
Due to COVID-19 ACTION PLAN, the patient's office visit was converted to a video visit. This visit is conducted using video and audio. The patient consents to this service.   The patient understands and accepts financial responsibility for any deductible, MINOCYCLINE  MG Oral Cap TAKE 1 CAPSULE(100 MG) BY MOUTH TWICE DAILY (Patient not taking: Reported on 11/10/2020) 180 capsule 0   • ibuprofen 200 MG Oral Tab Take 200 mg by mouth every 6 (six) hours as needed for Pain.      • Fluticasone Propionate 5 of restrictions of visitation. There are limitations of this visit as no physical exam could be performed. Every conscious effort was taken to allow for sufficient and adequate time.   This billing visit was spent on reviewing labs, medications, radiology

## 2021-04-09 DIAGNOSIS — Z23 NEED FOR VACCINATION: ICD-10-CM

## 2021-05-17 ENCOUNTER — TELEPHONE (OUTPATIENT)
Dept: INTERNAL MEDICINE CLINIC | Facility: CLINIC | Age: 61
End: 2021-05-17

## 2021-05-17 ENCOUNTER — OFFICE VISIT (OUTPATIENT)
Dept: INTERNAL MEDICINE CLINIC | Facility: CLINIC | Age: 61
End: 2021-05-17
Payer: COMMERCIAL

## 2021-05-17 VITALS
DIASTOLIC BLOOD PRESSURE: 84 MMHG | HEIGHT: 67.5 IN | BODY MASS INDEX: 34.81 KG/M2 | RESPIRATION RATE: 18 BRPM | TEMPERATURE: 98 F | SYSTOLIC BLOOD PRESSURE: 124 MMHG | WEIGHT: 224.38 LBS

## 2021-05-17 DIAGNOSIS — R21 RASH: Primary | ICD-10-CM

## 2021-05-17 PROCEDURE — 3079F DIAST BP 80-89 MM HG: CPT | Performed by: FAMILY MEDICINE

## 2021-05-17 PROCEDURE — 3074F SYST BP LT 130 MM HG: CPT | Performed by: FAMILY MEDICINE

## 2021-05-17 PROCEDURE — 99213 OFFICE O/P EST LOW 20 MIN: CPT | Performed by: FAMILY MEDICINE

## 2021-05-17 PROCEDURE — 3008F BODY MASS INDEX DOCD: CPT | Performed by: FAMILY MEDICINE

## 2021-05-17 NOTE — TELEPHONE ENCOUNTER
Patient states she was gardening yesterday, woke this am with raised red rash, burning and itchy on neck, face, ear, eye, chest, seems to be spreading, both sides of body. Appt scheduled with [facility] for today 5/17/21 at 11:20am.  Pt verbalizes understanding.

## 2021-05-17 NOTE — PROGRESS NOTES
Genesis Sun  11/6/1960    Patient presents with:  Derm Problem: MR rm 8 rash on next, chest, and face, pt states she eas gardening yesterday       HPI:   Genesis Sun is a 61year old female who presents for evaluation of rash that developed today u Bottle 1        Shellfish               NAUSEA AND VOMITING   Past Medical History:   Diagnosis Date   • Acute pharyngitis    • Cough    • Personal history of pneumonia (recurrent)    • Unspecified essential hypertension       Patient Active Problem List: exertion  CARDIOVASCULAR: denies chest pain on exertion  GI: no nausea or abdominal pain  NEURO: denies headaches    EXAM:   /84 (BP Location: Right arm, Patient Position: Sitting, Cuff Size: adult)   Temp 97.5 °F (36.4 °C) (Temporal)   Resp 18   Ht

## 2021-05-17 NOTE — TELEPHONE ENCOUNTER
Pt called and stated that she woke up with a rash on her neck and traveling on the RT side to her ear and by her eye     Please advise

## 2021-06-15 ENCOUNTER — TELEPHONE (OUTPATIENT)
Dept: INTERNAL MEDICINE CLINIC | Facility: CLINIC | Age: 61
End: 2021-06-15

## 2021-06-15 DIAGNOSIS — Z13.0 SCREENING FOR DISORDER OF BLOOD AND BLOOD-FORMING ORGANS: ICD-10-CM

## 2021-06-15 DIAGNOSIS — Z13.29 SCREENING FOR THYROID DISORDER: ICD-10-CM

## 2021-06-15 DIAGNOSIS — Z13.220 SCREENING FOR LIPID DISORDERS: ICD-10-CM

## 2021-06-15 DIAGNOSIS — Z00.00 ROUTINE GENERAL MEDICAL EXAMINATION AT A HEALTH CARE FACILITY: Primary | ICD-10-CM

## 2021-06-15 DIAGNOSIS — Z13.228 SCREENING FOR METABOLIC DISORDER: ICD-10-CM

## 2021-06-15 NOTE — TELEPHONE ENCOUNTER
Future Appointments   Date Time Provider Vibha Simon   7/2/2021  7:20 AM Barb Skaggs, ISA [facility]     Orders to edward= Pt informed that labs need to be completed no sooner than 2 weeks prior to the appt.  Pt aware to fast-no call back

## 2021-06-16 ENCOUNTER — HOSPITAL ENCOUNTER (OUTPATIENT)
Age: 61
Discharge: HOME | End: 2021-06-16
Payer: COMMERCIAL

## 2021-06-16 VITALS
RESPIRATION RATE: 16 BRPM | BODY MASS INDEX: 34.53 KG/M2 | OXYGEN SATURATION: 97 % | WEIGHT: 220 LBS | DIASTOLIC BLOOD PRESSURE: 86 MMHG | HEIGHT: 67 IN | SYSTOLIC BLOOD PRESSURE: 125 MMHG | TEMPERATURE: 98 F | HEART RATE: 79 BPM

## 2021-06-16 DIAGNOSIS — J02.9 SORE THROAT: Primary | ICD-10-CM

## 2021-06-16 PROCEDURE — 99213 OFFICE O/P EST LOW 20 MIN: CPT | Performed by: NURSE PRACTITIONER

## 2021-06-16 PROCEDURE — 87880 STREP A ASSAY W/OPTIC: CPT | Performed by: NURSE PRACTITIONER

## 2021-06-16 NOTE — ED PROVIDER NOTES
Patient Seen in: [facility]mediate [address] [place] [address]      History   Patient presents with:  Sore Throat    Stated Complaint: sore throat x 1 day     HPI/Subjective: This is a 25-year-old female with below stated medical history.   Presents to immediate care Negative for chills and fever. HENT: Positive for sore throat. Negative for congestion. Respiratory: Negative for cough, shortness of breath and wheezing. Cardiovascular: Negative for chest pain, palpitations and leg swelling.    Gastrointestinal: N Uvula midline. Posterior oropharyngeal erythema present. No pharyngeal swelling, oropharyngeal exudate or uvula swelling. Tonsils: No tonsillar exudate or tonsillar abscesses. 1+ on the right. 1+ on the left.    Eyes:      General:         Right eye: N pm    Follow-up:  Margie Turner MD  [address] [place] [address]  [phone]    In 1 week            Medications Prescribed:  Current Discharge Medication List

## 2021-06-21 RX ORDER — LOSARTAN POTASSIUM AND HYDROCHLOROTHIAZIDE 12.5; 1 MG/1; MG/1
1 TABLET ORAL DAILY
Qty: 90 TABLET | Refills: 1 | Status: SHIPPED | OUTPATIENT
Start: 2021-06-21 | End: 2021-07-02

## 2021-06-21 NOTE — TELEPHONE ENCOUNTER
Last OV: 5/17/21 acute f/u    Future Appointments   Date Time Provider Vibha Aurora   6/24/2021  8:30 AM REFERENCE [id number]   7/2/2021  7:20 AM IAS Scott [facility]        Latest labs: 6/5/20 CMP    Latest RX: LO

## 2021-06-24 ENCOUNTER — LAB ENCOUNTER (OUTPATIENT)
Dept: LAB | Age: 61
End: 2021-06-24
Attending: NURSE PRACTITIONER
Payer: COMMERCIAL

## 2021-06-24 DIAGNOSIS — Z13.228 SCREENING FOR METABOLIC DISORDER: ICD-10-CM

## 2021-06-24 DIAGNOSIS — Z00.00 ROUTINE GENERAL MEDICAL EXAMINATION AT A HEALTH CARE FACILITY: ICD-10-CM

## 2021-06-24 DIAGNOSIS — Z13.220 SCREENING FOR LIPID DISORDERS: ICD-10-CM

## 2021-06-24 DIAGNOSIS — Z13.0 SCREENING FOR DISORDER OF BLOOD AND BLOOD-FORMING ORGANS: ICD-10-CM

## 2021-06-24 DIAGNOSIS — Z13.29 SCREENING FOR THYROID DISORDER: ICD-10-CM

## 2021-06-24 PROCEDURE — 80061 LIPID PANEL: CPT | Performed by: NURSE PRACTITIONER

## 2021-06-24 PROCEDURE — 80050 GENERAL HEALTH PANEL: CPT | Performed by: NURSE PRACTITIONER

## 2021-07-02 ENCOUNTER — OFFICE VISIT (OUTPATIENT)
Dept: INTERNAL MEDICINE CLINIC | Facility: CLINIC | Age: 61
End: 2021-07-02
Payer: COMMERCIAL

## 2021-07-02 VITALS
RESPIRATION RATE: 16 BRPM | HEART RATE: 88 BPM | SYSTOLIC BLOOD PRESSURE: 126 MMHG | HEIGHT: 67 IN | WEIGHT: 223 LBS | BODY MASS INDEX: 35 KG/M2 | DIASTOLIC BLOOD PRESSURE: 76 MMHG | TEMPERATURE: 97 F

## 2021-07-02 DIAGNOSIS — M51.37 DEGENERATION OF LUMBAR OR LUMBOSACRAL INTERVERTEBRAL DISC: ICD-10-CM

## 2021-07-02 DIAGNOSIS — Z00.00 ROUTINE GENERAL MEDICAL EXAMINATION AT A HEALTH CARE FACILITY: Primary | ICD-10-CM

## 2021-07-02 DIAGNOSIS — Z12.31 ENCOUNTER FOR SCREENING MAMMOGRAM FOR MALIGNANT NEOPLASM OF BREAST: ICD-10-CM

## 2021-07-02 DIAGNOSIS — H40.012 OPEN ANGLE WITH BORDERLINE FINDINGS OF LEFT EYE: ICD-10-CM

## 2021-07-02 DIAGNOSIS — G43.809 OTHER MIGRAINE WITHOUT STATUS MIGRAINOSUS, NOT INTRACTABLE: ICD-10-CM

## 2021-07-02 DIAGNOSIS — Z78.0 POSTMENOPAUSAL: ICD-10-CM

## 2021-07-02 DIAGNOSIS — E04.2 MULTIPLE THYROID NODULES: ICD-10-CM

## 2021-07-02 DIAGNOSIS — I10 ESSENTIAL HYPERTENSION: ICD-10-CM

## 2021-07-02 PROCEDURE — 99396 PREV VISIT EST AGE 40-64: CPT | Performed by: NURSE PRACTITIONER

## 2021-07-02 PROCEDURE — 3078F DIAST BP <80 MM HG: CPT | Performed by: NURSE PRACTITIONER

## 2021-07-02 PROCEDURE — 3008F BODY MASS INDEX DOCD: CPT | Performed by: NURSE PRACTITIONER

## 2021-07-02 PROCEDURE — 3074F SYST BP LT 130 MM HG: CPT | Performed by: NURSE PRACTITIONER

## 2021-07-02 RX ORDER — CETIRIZINE HYDROCHLORIDE 5 MG/1
5 TABLET ORAL DAILY
COMMUNITY

## 2021-07-02 RX ORDER — BUPRENORPHINE HCL 8 MG/1
1 TABLET SUBLINGUAL DAILY
COMMUNITY

## 2021-07-02 RX ORDER — LOSARTAN POTASSIUM AND HYDROCHLOROTHIAZIDE 25; 100 MG/1; MG/1
1 TABLET ORAL DAILY
Qty: 90 TABLET | Refills: 3 | Status: SHIPPED | OUTPATIENT
Start: 2021-07-02 | End: 2022-06-27

## 2021-07-02 RX ORDER — MONTELUKAST SODIUM 10 MG/1
10 TABLET ORAL DAILY
Qty: 90 TABLET | Refills: 3 | Status: SHIPPED | OUTPATIENT
Start: 2021-07-02 | End: 2022-06-27

## 2021-07-02 NOTE — PROGRESS NOTES
Carlene Bajwa is a 61year old female who presents for a complete physical exam:       Patient complains of:    HTN  Noting her bp at home consistnetly 527 systolic  Losartan hct 925/07.9BS  Some LE ankle swelling at time with hotter weather.   Will try 2 Tab Take 1 tablet (10 mg total) by mouth daily. 90 tablet 3   • Losartan Potassium-HCTZ 100-25 MG Oral Tab Take 1 tablet by mouth daily.  90 tablet 3   • Clindamycin Phosphate 1 % External Foam Use on AA prn 50 g 3   • MINOCYCLINE  MG Oral Cap TAKE 1 shingrix.       Immunization History  Administered            Date(s) Administered    TD                    03/29/2006      TDAP                  02/28/2017    Dental Visits: yes   Colon cancer screening:  Colonoscopy due on 07/11/2027   Gyne:   Pap Smear,3 woman with Dr Abdirizak Baptiste.   Due for mammogram.     Routine general medical examination at a health care facility  (primary encounter diagnosis)  Degeneration of lumbar or lumbosacral intervertebral disc  Multiple thyroid nodules  US ordered  F/u with Dr Rk Kramer if

## 2022-01-25 ENCOUNTER — HOSPITAL ENCOUNTER (OUTPATIENT)
Dept: MAMMOGRAPHY | Age: 62
Discharge: HOME | End: 2022-01-25
Attending: NURSE PRACTITIONER
Payer: COMMERCIAL

## 2022-01-25 DIAGNOSIS — Z12.31 ENCOUNTER FOR SCREENING MAMMOGRAM FOR MALIGNANT NEOPLASM OF BREAST: ICD-10-CM

## 2022-01-25 PROCEDURE — 77063 BREAST TOMOSYNTHESIS BI: CPT | Performed by: NURSE PRACTITIONER

## 2022-01-25 PROCEDURE — 77067 SCR MAMMO BI INCL CAD: CPT | Performed by: NURSE PRACTITIONER

## 2022-05-24 ENCOUNTER — HOSPITAL ENCOUNTER (OUTPATIENT)
Age: 62
Discharge: HOME | End: 2022-05-24

## 2022-05-24 ENCOUNTER — TELEPHONE (OUTPATIENT)
Dept: INTERNAL MEDICINE CLINIC | Facility: CLINIC | Age: 62
End: 2022-05-24

## 2022-05-24 VITALS
TEMPERATURE: 100 F | WEIGHT: 230 LBS | BODY MASS INDEX: 35.68 KG/M2 | RESPIRATION RATE: 18 BRPM | HEART RATE: 92 BPM | DIASTOLIC BLOOD PRESSURE: 71 MMHG | SYSTOLIC BLOOD PRESSURE: 124 MMHG | OXYGEN SATURATION: 96 % | HEIGHT: 67.5 IN

## 2022-05-24 DIAGNOSIS — Z20.822 ENCOUNTER FOR SCREENING LABORATORY TESTING FOR COVID-19 VIRUS: ICD-10-CM

## 2022-05-24 DIAGNOSIS — U07.1 COVID-19: Primary | ICD-10-CM

## 2022-05-24 LAB — SARS-COV-2 RNA RESP QL NAA+PROBE: DETECTED

## 2022-05-24 PROCEDURE — 99214 OFFICE O/P EST MOD 30 MIN: CPT | Performed by: PHYSICIAN ASSISTANT

## 2022-05-24 PROCEDURE — U0002 COVID-19 LAB TEST NON-CDC: HCPCS | Performed by: PHYSICIAN ASSISTANT

## 2022-05-24 NOTE — TELEPHONE ENCOUNTER
What is the patient's vaccine status? Not vaccinated   Is the patient symptomatic? Sore throat ears are clogged and body aches, fever 103.03    If so, what are the symptoms and when did symptoms start? This morning sore throat when she woke up     When was the exposure? pt went to Urgent care and he tested positive today     Were one or both people involved unmasked for more than 15 minutes when exposed? Yes     Have you had a covid test (home, pharmacy or within a healthcare system)?  No   If you have results we can view please bring those with you

## 2022-05-25 NOTE — ED INITIAL ASSESSMENT (HPI)
tested positive for Covid-19 today  Sore throat, non productive cough, fever and body aches  Denies any CV-19 vaccine

## 2022-05-26 ENCOUNTER — TELEPHONE (OUTPATIENT)
Dept: INTERNAL MEDICINE CLINIC | Facility: CLINIC | Age: 62
End: 2022-05-26

## 2022-05-26 NOTE — TELEPHONE ENCOUNTER
Patient contacted and covid positive 5/24/2022- sore throat, feels like she has a bad sinus infection. Fevers responding to tylenol. No sob, cp, severe weakness. Patient is not interested in mab that was offered in [facility] 5/24/2022. Patient asking about paxlovid as this is what her  is on. She is not sure if she would like to pursue. Patient will think about it and call back. Patient on losartan and would need to discontinue to be on paxlovid-  She will call back if she changes her mind. Aware would need to start by tomorrow if she changes her mind. Patient denies questions and will call back with any updates. Sent to SD as FYI.

## 2022-05-26 NOTE — TELEPHONE ENCOUNTER
What is the patient's vaccine status? Not Vaccinated--Covid + on 5/24  Is the patient symptomatic? Yes  If so, what are the symptoms and when did symptoms start? Sore throat,ear pain,fever-keeping it down with tylenol,slight cough. When was the exposure? Not sure  Were one or both people involved unmasked for more than 15 minutes when exposed? Don't know  Have you had a covid test (home, pharmacy or within a healthcare system)? Urgent care  If you have results we can view please bring those with you  Please call patient.

## 2022-06-06 ENCOUNTER — HOSPITAL ENCOUNTER (OUTPATIENT)
Age: 62
Discharge: HOME | End: 2022-06-06
Attending: EMERGENCY MEDICINE
Payer: COMMERCIAL

## 2022-06-06 ENCOUNTER — APPOINTMENT (OUTPATIENT)
Dept: GENERAL RADIOLOGY | Age: 62
End: 2022-06-06
Attending: EMERGENCY MEDICINE
Payer: COMMERCIAL

## 2022-06-06 ENCOUNTER — TELEPHONE (OUTPATIENT)
Dept: INTERNAL MEDICINE CLINIC | Facility: CLINIC | Age: 62
End: 2022-06-06

## 2022-06-06 VITALS
HEART RATE: 87 BPM | RESPIRATION RATE: 18 BRPM | DIASTOLIC BLOOD PRESSURE: 74 MMHG | WEIGHT: 224 LBS | OXYGEN SATURATION: 94 % | TEMPERATURE: 98 F | BODY MASS INDEX: 34.75 KG/M2 | HEIGHT: 67.5 IN | SYSTOLIC BLOOD PRESSURE: 122 MMHG

## 2022-06-06 DIAGNOSIS — U07.1 COVID-19: Primary | ICD-10-CM

## 2022-06-06 LAB
#MXD IC: 1 X10ˆ3/UL (ref 0.1–1)
BUN BLD-MCNC: 21 MG/DL (ref 7–18)
CHLORIDE BLD-SCNC: 101 MMOL/L (ref 98–112)
CO2 BLD-SCNC: 27 MMOL/L (ref 21–32)
CREAT BLD-MCNC: 0.9 MG/DL
DDIMER WHOLE BLOOD: 305 NG/ML DDU (ref ?–400)
GLUCOSE BLD-MCNC: 136 MG/DL (ref 70–99)
HCT VFR BLD AUTO: 42.2 %
HCT VFR BLD CALC: 40 %
HGB BLD-MCNC: 14.4 G/DL
ISTAT IONIZED CALCIUM FOR CHEM 8: 1.31 MMOL/L (ref 1.12–1.32)
LYMPHOCYTES # BLD AUTO: 2.4 X10ˆ3/UL (ref 1–4)
LYMPHOCYTES NFR BLD AUTO: 25.3 %
MCH RBC QN AUTO: 29.4 PG (ref 26–34)
MCHC RBC AUTO-ENTMCNC: 34.1 G/DL (ref 31–37)
MCV RBC AUTO: 86.3 FL (ref 80–100)
MIXED CELL %: 11.1 %
NEUTROPHILS # BLD AUTO: 5.9 X10ˆ3/UL (ref 1.5–7.7)
NEUTROPHILS NFR BLD AUTO: 63.6 %
PLATELET # BLD AUTO: 269 X10ˆ3/UL (ref 150–450)
POTASSIUM BLD-SCNC: 3.5 MMOL/L (ref 3.6–5.1)
RBC # BLD AUTO: 4.89 X10ˆ6/UL
SODIUM BLD-SCNC: 140 MMOL/L (ref 136–145)
TROPONIN I BLD-MCNC: <0.02 NG/ML
WBC # BLD AUTO: 9.3 X10ˆ3/UL (ref 4–11)

## 2022-06-06 PROCEDURE — 80047 BASIC METABLC PNL IONIZED CA: CPT

## 2022-06-06 PROCEDURE — 71046 X-RAY EXAM CHEST 2 VIEWS: CPT | Performed by: EMERGENCY MEDICINE

## 2022-06-06 PROCEDURE — 99214 OFFICE O/P EST MOD 30 MIN: CPT

## 2022-06-06 PROCEDURE — 93005 ELECTROCARDIOGRAM TRACING: CPT

## 2022-06-06 PROCEDURE — 84484 ASSAY OF TROPONIN QUANT: CPT

## 2022-06-06 PROCEDURE — 36415 COLL VENOUS BLD VENIPUNCTURE: CPT

## 2022-06-06 PROCEDURE — 99215 OFFICE O/P EST HI 40 MIN: CPT

## 2022-06-06 PROCEDURE — 85025 COMPLETE CBC W/AUTO DIFF WBC: CPT | Performed by: EMERGENCY MEDICINE

## 2022-06-06 PROCEDURE — 85378 FIBRIN DEGRADE SEMIQUANT: CPT | Performed by: EMERGENCY MEDICINE

## 2022-06-06 PROCEDURE — 93010 ELECTROCARDIOGRAM REPORT: CPT

## 2022-06-06 RX ORDER — ALBUTEROL SULFATE 90 UG/1
2 AEROSOL, METERED RESPIRATORY (INHALATION) EVERY 4 HOURS PRN
Qty: 1 EACH | Refills: 0 | Status: SHIPPED | OUTPATIENT
Start: 2022-06-06 | End: 2022-07-06

## 2022-06-06 RX ORDER — FLUTICASONE PROPIONATE 50 MCG
SPRAY, SUSPENSION (ML) NASAL DAILY
COMMUNITY

## 2022-06-06 NOTE — TELEPHONE ENCOUNTER
Spoke to pt. Pt said that when she is trying to work outside, she will get tired quickly/will get out of breath. Occasional will still have chest congestion. Pt said she is still just very tired and is sleeping more than usual. Offered sooner appt tomorrow with other provider. Pt said that she can wait until Wednesday. UC/ER warnings provided. Pt stated understanding and agreed to plan.      IRINEOI to SD.

## 2022-06-06 NOTE — ED INITIAL ASSESSMENT (HPI)
Pt presents tonight with c/o productive cough with clear-yellow sputum x 3 weeks. Pt states that she was dx with covid x 3 weeks ago and is still coughing. Pt states that she had fevers for the first week of symptoms but none since that time.

## 2022-06-06 NOTE — TELEPHONE ENCOUNTER
Patient notified to go to the [facility] for evaluation. Pt verbalizes understanding. Price (Do Not Change): 0.00 Instructions: This plan will send the code FBSD to the PM system.  DO NOT or CHANGE the price. Detail Level: Generalized

## 2022-06-06 NOTE — TELEPHONE ENCOUNTER
Pt called and stated she was DX with covid 05/24 and still having a lot of breathing and chest issues (pt sounded SOB on the phone)    I scheduled the pt for   Future Appointments   Date Time Provider Vibha Simon   6/8/2022  8:00 AM ISA Suresh [facility] [address] to wait until scheduled appt or does pt need to be seen sooner?     Please advise

## 2022-06-07 LAB
ATRIAL RATE: 81 BPM
ATRIAL RATE: 94 BPM
P AXIS: 18 DEGREES
P AXIS: 38 DEGREES
P-R INTERVAL: 156 MS
P-R INTERVAL: 186 MS
Q-T INTERVAL: 364 MS
Q-T INTERVAL: 378 MS
QRS DURATION: 100 MS
QRS DURATION: 80 MS
QTC CALCULATION (BEZET): 439 MS
QTC CALCULATION (BEZET): 455 MS
R AXIS: -24 DEGREES
R AXIS: -7 DEGREES
T AXIS: 3 DEGREES
T AXIS: 50 DEGREES
VENTRICULAR RATE: 81 BPM
VENTRICULAR RATE: 94 BPM

## 2022-06-07 NOTE — TELEPHONE ENCOUNTER
Patient was sent back to [facility] 6/6/22 due to shortness of breath for evaluation per SD.

## 2022-06-23 ENCOUNTER — APPOINTMENT (OUTPATIENT)
Dept: CT IMAGING | Facility: HOSPITAL | Age: 62
End: 2022-06-23
Attending: EMERGENCY MEDICINE
Payer: COMMERCIAL

## 2022-06-23 ENCOUNTER — HOSPITAL ENCOUNTER (OUTPATIENT)
Age: 62
Discharge: ANOTHER HOSPITAL | End: 2022-06-23
Payer: COMMERCIAL

## 2022-06-23 ENCOUNTER — HOSPITAL ENCOUNTER (OUTPATIENT)
Facility: HOSPITAL | Age: 62
Setting detail: OBSERVATION
Discharge: HOME | End: 2022-06-24
Attending: EMERGENCY MEDICINE | Admitting: HOSPITALIST
Payer: COMMERCIAL

## 2022-06-23 ENCOUNTER — APPOINTMENT (OUTPATIENT)
Dept: GENERAL RADIOLOGY | Age: 62
End: 2022-06-23
Attending: NURSE PRACTITIONER
Payer: COMMERCIAL

## 2022-06-23 VITALS
BODY MASS INDEX: 35.37 KG/M2 | TEMPERATURE: 98 F | DIASTOLIC BLOOD PRESSURE: 82 MMHG | HEIGHT: 67.5 IN | HEART RATE: 89 BPM | RESPIRATION RATE: 18 BRPM | OXYGEN SATURATION: 95 % | SYSTOLIC BLOOD PRESSURE: 148 MMHG | WEIGHT: 228 LBS

## 2022-06-23 DIAGNOSIS — I26.99 PULMONARY EMBOLISM, BILATERAL (HCC): Primary | ICD-10-CM

## 2022-06-23 DIAGNOSIS — I26.99 PULMONARY INFARCT (HCC): ICD-10-CM

## 2022-06-23 DIAGNOSIS — R06.02 SHORTNESS OF BREATH: ICD-10-CM

## 2022-06-23 DIAGNOSIS — R79.89 ELEVATED D-DIMER: Primary | ICD-10-CM

## 2022-06-23 LAB
ALBUMIN SERPL-MCNC: 3.9 G/DL (ref 3.4–5)
ALBUMIN/GLOB SERPL: 1 {RATIO} (ref 1–2)
ALP LIVER SERPL-CCNC: 77 U/L
ALT SERPL-CCNC: 32 U/L
ANION GAP SERPL CALC-SCNC: 4 MMOL/L (ref 0–18)
APTT PPP: 31.7 SECONDS (ref 23.3–35.6)
AST SERPL-CCNC: 21 U/L (ref 15–37)
BASOPHILS # BLD AUTO: 0.07 X10(3) UL (ref 0–0.2)
BASOPHILS # BLD AUTO: 0.07 X10(3) UL (ref 0–0.2)
BASOPHILS NFR BLD AUTO: 0.9 %
BASOPHILS NFR BLD AUTO: 0.9 %
BILIRUB SERPL-MCNC: 0.7 MG/DL (ref 0.1–2)
BUN BLD-MCNC: 12 MG/DL (ref 7–18)
BUN BLD-MCNC: 14 MG/DL (ref 7–18)
CALCIUM BLD-MCNC: 9.8 MG/DL (ref 8.5–10.1)
CHLORIDE BLD-SCNC: 101 MMOL/L (ref 98–112)
CHLORIDE SERPL-SCNC: 103 MMOL/L (ref 98–112)
CO2 BLD-SCNC: 29 MMOL/L (ref 21–32)
CO2 SERPL-SCNC: 29 MMOL/L (ref 21–32)
CREAT BLD-MCNC: 0.8 MG/DL
CREAT BLD-MCNC: 0.9 MG/DL
DDIMER WHOLE BLOOD: 1370 NG/ML DDU (ref ?–400)
EOSINOPHIL # BLD AUTO: 0.27 X10(3) UL (ref 0–0.7)
EOSINOPHIL # BLD AUTO: 0.28 X10(3) UL (ref 0–0.7)
EOSINOPHIL NFR BLD AUTO: 3.4 %
EOSINOPHIL NFR BLD AUTO: 3.5 %
ERYTHROCYTE [DISTWIDTH] IN BLOOD BY AUTOMATED COUNT: 12.6 %
ERYTHROCYTE [DISTWIDTH] IN BLOOD BY AUTOMATED COUNT: 12.7 %
GLOBULIN PLAS-MCNC: 4 G/DL (ref 2.8–4.4)
GLUCOSE BLD-MCNC: 111 MG/DL (ref 70–99)
GLUCOSE BLD-MCNC: 133 MG/DL (ref 70–99)
HCT VFR BLD AUTO: 40.3 %
HCT VFR BLD AUTO: 40.4 %
HCT VFR BLD AUTO: 41.1 %
HCT VFR BLD CALC: 39 %
HGB BLD-MCNC: 13.8 G/DL
HGB BLD-MCNC: 13.8 G/DL
HGB BLD-MCNC: 14.2 G/DL
IMM GRANULOCYTES # BLD AUTO: 0.02 X10(3) UL (ref 0–1)
IMM GRANULOCYTES # BLD AUTO: 0.04 X10(3) UL (ref 0–1)
IMM GRANULOCYTES NFR BLD: 0.3 %
IMM GRANULOCYTES NFR BLD: 0.5 %
INR BLD: 1.06 (ref 0.8–1.2)
ISTAT IONIZED CALCIUM FOR CHEM 8: 1.19 MMOL/L (ref 1.12–1.32)
LACTATE SERPL-SCNC: 0.8 MMOL/L (ref 0.4–2)
LYMPHOCYTES # BLD AUTO: 1.64 X10(3) UL (ref 1–4)
LYMPHOCYTES # BLD AUTO: 1.78 X10(3) UL (ref 1–4)
LYMPHOCYTES NFR BLD AUTO: 21.4 %
LYMPHOCYTES NFR BLD AUTO: 21.8 %
MCH RBC QN AUTO: 29.6 PG (ref 26–34)
MCH RBC QN AUTO: 29.8 PG (ref 26–34)
MCH RBC QN AUTO: 30 PG (ref 26–34)
MCHC RBC AUTO-ENTMCNC: 34.2 G/DL (ref 31–37)
MCHC RBC AUTO-ENTMCNC: 34.2 G/DL (ref 31–37)
MCHC RBC AUTO-ENTMCNC: 34.5 G/DL (ref 31–37)
MCV RBC AUTO: 86.5 FL
MCV RBC AUTO: 86.7 FL
MCV RBC AUTO: 87.3 FL (ref 80–100)
MONOCYTES # BLD AUTO: 0.69 X10(3) UL (ref 0.1–1)
MONOCYTES # BLD AUTO: 0.72 X10(3) UL (ref 0.1–1)
MONOCYTES NFR BLD AUTO: 8.8 %
MONOCYTES NFR BLD AUTO: 9 %
NEUTROPHILS # BLD AUTO: 4.97 X10 (3) UL (ref 1.5–7.7)
NEUTROPHILS # BLD AUTO: 4.97 X10(3) UL (ref 1.5–7.7)
NEUTROPHILS # BLD AUTO: 5.29 X10 (3) UL (ref 1.5–7.7)
NEUTROPHILS # BLD AUTO: 5.29 X10(3) UL (ref 1.5–7.7)
NEUTROPHILS NFR BLD AUTO: 64.6 %
NEUTROPHILS NFR BLD AUTO: 64.9 %
NT-PROBNP SERPL-MCNC: 28 PG/ML (ref ?–125)
OSMOLALITY SERPL CALC.SUM OF ELEC: 282 MOSM/KG (ref 275–295)
PLATELET # BLD AUTO: 220 10(3)UL (ref 150–450)
PLATELET # BLD AUTO: 231 10(3)UL (ref 150–450)
POTASSIUM BLD-SCNC: 4.5 MMOL/L (ref 3.6–5.1)
POTASSIUM SERPL-SCNC: 3.4 MMOL/L (ref 3.5–5.1)
PROT SERPL-MCNC: 7.9 G/DL (ref 6.4–8.2)
PROTHROMBIN TIME: 13.8 SECONDS (ref 11.6–14.8)
RBC # BLD AUTO: 4.63 X10ˆ6/UL
RBC # BLD AUTO: 4.66 X10(6)UL
RBC # BLD AUTO: 4.74 X10(6)UL
SARS-COV-2 RNA RESP QL NAA+PROBE: NOT DETECTED
SODIUM BLD-SCNC: 138 MMOL/L (ref 136–145)
SODIUM SERPL-SCNC: 136 MMOL/L (ref 136–145)
TROPONIN I BLD-MCNC: <0.02 NG/ML
TROPONIN I HIGH SENSITIVITY: 4 NG/L
WBC # BLD AUTO: 7.7 X10(3) UL (ref 4–11)
WBC # BLD AUTO: 8.2 X10(3) UL (ref 4–11)

## 2022-06-23 PROCEDURE — 85378 FIBRIN DEGRADE SEMIQUANT: CPT | Performed by: NURSE PRACTITIONER

## 2022-06-23 PROCEDURE — 71046 X-RAY EXAM CHEST 2 VIEWS: CPT | Performed by: NURSE PRACTITIONER

## 2022-06-23 PROCEDURE — 99215 OFFICE O/P EST HI 40 MIN: CPT

## 2022-06-23 PROCEDURE — 85025 COMPLETE CBC W/AUTO DIFF WBC: CPT | Performed by: NURSE PRACTITIONER

## 2022-06-23 PROCEDURE — 93010 ELECTROCARDIOGRAM REPORT: CPT

## 2022-06-23 PROCEDURE — 71275 CT ANGIOGRAPHY CHEST: CPT | Performed by: EMERGENCY MEDICINE

## 2022-06-23 PROCEDURE — 84484 ASSAY OF TROPONIN QUANT: CPT

## 2022-06-23 PROCEDURE — 99223 1ST HOSP IP/OBS HIGH 75: CPT | Performed by: HOSPITALIST

## 2022-06-23 PROCEDURE — 80047 BASIC METABLC PNL IONIZED CA: CPT

## 2022-06-23 PROCEDURE — 36415 COLL VENOUS BLD VENIPUNCTURE: CPT

## 2022-06-23 RX ORDER — FLUTICASONE PROPIONATE 50 MCG
2 SPRAY, SUSPENSION (ML) NASAL DAILY
Status: DISCONTINUED | OUTPATIENT
Start: 2022-06-23 | End: 2022-06-24

## 2022-06-23 RX ORDER — MONTELUKAST SODIUM 10 MG/1
10 TABLET ORAL NIGHTLY
COMMUNITY
Start: 2022-06-03

## 2022-06-23 RX ORDER — IOHEXOL 350 MG/ML
100 INJECTION, SOLUTION INTRAVENOUS
Status: COMPLETED | OUTPATIENT
Start: 2022-06-23 | End: 2022-06-23

## 2022-06-23 RX ORDER — PROCHLORPERAZINE EDISYLATE 5 MG/ML
5 INJECTION INTRAMUSCULAR; INTRAVENOUS EVERY 8 HOURS PRN
Status: DISCONTINUED | OUTPATIENT
Start: 2022-06-23 | End: 2022-06-24

## 2022-06-23 RX ORDER — HYDROCODONE BITARTRATE AND ACETAMINOPHEN 5; 325 MG/1; MG/1
1 TABLET ORAL EVERY 4 HOURS PRN
Status: DISCONTINUED | OUTPATIENT
Start: 2022-06-23 | End: 2022-06-24

## 2022-06-23 RX ORDER — HEPARIN SODIUM 1000 [USP'U]/ML
80 INJECTION, SOLUTION INTRAVENOUS; SUBCUTANEOUS ONCE
Status: COMPLETED | OUTPATIENT
Start: 2022-06-23 | End: 2022-06-23

## 2022-06-23 RX ORDER — MELATONIN
3 NIGHTLY PRN
Status: DISCONTINUED | OUTPATIENT
Start: 2022-06-23 | End: 2022-06-24

## 2022-06-23 RX ORDER — MORPHINE SULFATE 2 MG/ML
2 INJECTION, SOLUTION INTRAMUSCULAR; INTRAVENOUS EVERY 2 HOUR PRN
Status: DISCONTINUED | OUTPATIENT
Start: 2022-06-23 | End: 2022-06-24

## 2022-06-23 RX ORDER — MONTELUKAST SODIUM 10 MG/1
10 TABLET ORAL DAILY
Status: DISCONTINUED | OUTPATIENT
Start: 2022-06-24 | End: 2022-06-24

## 2022-06-23 RX ORDER — MORPHINE SULFATE 4 MG/ML
4 INJECTION, SOLUTION INTRAMUSCULAR; INTRAVENOUS EVERY 2 HOUR PRN
Status: DISCONTINUED | OUTPATIENT
Start: 2022-06-23 | End: 2022-06-24

## 2022-06-23 RX ORDER — ALBUTEROL SULFATE 90 UG/1
2 AEROSOL, METERED RESPIRATORY (INHALATION) EVERY 4 HOURS PRN
Status: DISCONTINUED | OUTPATIENT
Start: 2022-06-23 | End: 2022-06-24

## 2022-06-23 RX ORDER — HEPARIN SODIUM AND DEXTROSE 10000; 5 [USP'U]/100ML; G/100ML
18 INJECTION INTRAVENOUS ONCE
Status: COMPLETED | OUTPATIENT
Start: 2022-06-23 | End: 2022-06-24

## 2022-06-23 RX ORDER — HEPARIN SODIUM AND DEXTROSE 10000; 5 [USP'U]/100ML; G/100ML
INJECTION INTRAVENOUS CONTINUOUS
Status: DISCONTINUED | OUTPATIENT
Start: 2022-06-24 | End: 2022-06-24

## 2022-06-23 RX ORDER — ONDANSETRON 2 MG/ML
4 INJECTION INTRAMUSCULAR; INTRAVENOUS EVERY 6 HOURS PRN
Status: DISCONTINUED | OUTPATIENT
Start: 2022-06-23 | End: 2022-06-24

## 2022-06-23 RX ORDER — ACETAMINOPHEN 325 MG/1
650 TABLET ORAL EVERY 4 HOURS PRN
Status: DISCONTINUED | OUTPATIENT
Start: 2022-06-23 | End: 2022-06-24

## 2022-06-23 RX ORDER — HYDROCODONE BITARTRATE AND ACETAMINOPHEN 5; 325 MG/1; MG/1
2 TABLET ORAL EVERY 4 HOURS PRN
Status: DISCONTINUED | OUTPATIENT
Start: 2022-06-23 | End: 2022-06-24

## 2022-06-23 RX ORDER — MORPHINE SULFATE 2 MG/ML
1 INJECTION, SOLUTION INTRAMUSCULAR; INTRAVENOUS EVERY 2 HOUR PRN
Status: DISCONTINUED | OUTPATIENT
Start: 2022-06-23 | End: 2022-06-24

## 2022-06-23 NOTE — ED INITIAL ASSESSMENT (HPI)
Pt here from [facility] for CTA d/t elevated DDimer, reports sob with coughing that started 5/24 when she was tested positive for covid.

## 2022-06-23 NOTE — ED INITIAL ASSESSMENT (HPI)
C/o cough, chest tightness and shortness of breath for over 2 weeks. Low grade fever in the late afternoon. Covid positive 5/24/22.

## 2022-06-24 ENCOUNTER — APPOINTMENT (OUTPATIENT)
Dept: ULTRASOUND IMAGING | Facility: HOSPITAL | Age: 62
End: 2022-06-24
Attending: HOSPITALIST
Payer: COMMERCIAL

## 2022-06-24 ENCOUNTER — APPOINTMENT (OUTPATIENT)
Dept: CV DIAGNOSTICS | Facility: HOSPITAL | Age: 62
End: 2022-06-24
Attending: NURSE PRACTITIONER
Payer: COMMERCIAL

## 2022-06-24 ENCOUNTER — APPOINTMENT (OUTPATIENT)
Dept: CV DIAGNOSTICS | Facility: HOSPITAL | Age: 62
End: 2022-06-24
Attending: HOSPITALIST
Payer: COMMERCIAL

## 2022-06-24 VITALS
OXYGEN SATURATION: 96 % | SYSTOLIC BLOOD PRESSURE: 111 MMHG | HEART RATE: 71 BPM | RESPIRATION RATE: 18 BRPM | BODY MASS INDEX: 35.79 KG/M2 | DIASTOLIC BLOOD PRESSURE: 74 MMHG | TEMPERATURE: 99 F | WEIGHT: 228 LBS | HEIGHT: 67 IN

## 2022-06-24 LAB
APTT PPP: 105.2 SECONDS (ref 23.3–35.6)
APTT PPP: 208.9 SECONDS (ref 23.3–35.6)
APTT PPP: 66.9 SECONDS (ref 23.3–35.6)
ATRIAL RATE: 85 BPM
P AXIS: 25 DEGREES
P-R INTERVAL: 186 MS
Q-T INTERVAL: 378 MS
QRS DURATION: 78 MS
QTC CALCULATION (BEZET): 449 MS
R AXIS: -26 DEGREES
T AXIS: 32 DEGREES
VENTRICULAR RATE: 85 BPM

## 2022-06-24 PROCEDURE — 93306 TTE W/DOPPLER COMPLETE: CPT | Performed by: NURSE PRACTITIONER

## 2022-06-24 PROCEDURE — 99239 HOSP IP/OBS DSCHRG MGMT >30: CPT | Performed by: HOSPITALIST

## 2022-06-24 PROCEDURE — 93971 EXTREMITY STUDY: CPT | Performed by: HOSPITALIST

## 2022-06-24 NOTE — PLAN OF CARE
Assumed care at 0700. Pt A/O x4. RA. NSR on tele. VSS. Heparin gtt infusing at 14ml/hr. Next PTT at        .   LE completed. Dr. Annette Guevara notified of results. Echo completed. Plan for pt to transition to Eliquis. Kidd checked by Abbie Rucker. Pt given first dose of eliquis, heparin gtt d/c'd 1hr later per order. All consults cleared for discharge. Pt given discharge instructions. IV removed. Tele removed. Pt discharged home via wheel chair.

## 2022-06-24 NOTE — PROGRESS NOTES
Patient is doing well, undergoing echo. She is up to date with cancer screening, recently diagnosed covid and has been sedentary. Await echo, us, VIDA barger.  Can likely dc later today on eliquis, discussed with RN, will check on cost with pharmacy.     Anna Marie Leach MD  [facility]

## 2022-06-24 NOTE — ED QUICK NOTES
Orders for admission, patient is aware of plan and ready to go upstairs. Any questions, please call ED RN [phone]    Vaccinated?  No  Type of COVID test sent:Rapid SARS  COVID Suspicion level: Low      Titratable drug(s) infusing:  Rate:    LOC at time of transport: Alert    Other pertinent information: Pt on heparin    CIWA score=N/A  NIH score= N/a

## 2022-06-25 NOTE — PAYOR COMM NOTE
--------------  DISCHARGE REVIEW    Payor: [facility] PPO  Subscriber #:  [id number]  Authorization Number: [id number]      Admit date: 6/23/22  Admit time:  10:41 PM  Discharge Date: 6/24/2022  5:27 PM     Admitting Physician: Trish Huynh MD  Attending Physician:  No att. providers found  Primary Care Physician: Taylor Simmons MD

## 2022-06-27 ENCOUNTER — PATIENT OUTREACH (OUTPATIENT)
Dept: CASE MANAGEMENT | Age: 62
End: 2022-06-27

## 2022-06-27 DIAGNOSIS — Z02.9 ENCOUNTERS FOR UNSPECIFIED ADMINISTRATIVE PURPOSE: ICD-10-CM

## 2022-06-27 DIAGNOSIS — I26.99 PULMONARY EMBOLISM, BILATERAL (HCC): ICD-10-CM

## 2022-06-30 ENCOUNTER — OFFICE VISIT (OUTPATIENT)
Dept: INTERNAL MEDICINE CLINIC | Facility: CLINIC | Age: 62
End: 2022-06-30
Payer: COMMERCIAL

## 2022-06-30 VITALS
SYSTOLIC BLOOD PRESSURE: 120 MMHG | BODY MASS INDEX: 35.84 KG/M2 | OXYGEN SATURATION: 99 % | WEIGHT: 228.38 LBS | DIASTOLIC BLOOD PRESSURE: 62 MMHG | HEIGHT: 67 IN | HEART RATE: 81 BPM

## 2022-06-30 DIAGNOSIS — I10 ESSENTIAL HYPERTENSION: ICD-10-CM

## 2022-06-30 DIAGNOSIS — I26.99 PULMONARY EMBOLISM, BILATERAL (HCC): Primary | ICD-10-CM

## 2022-06-30 DIAGNOSIS — Z86.16 PERSONAL HISTORY OF COVID-19: ICD-10-CM

## 2022-06-30 DIAGNOSIS — Z82.49 FAMILY HISTORY OF BLOOD CLOTS: ICD-10-CM

## 2022-06-30 DIAGNOSIS — I82.402 LEG DVT (DEEP VENOUS THROMBOEMBOLISM), ACUTE, LEFT (HCC): ICD-10-CM

## 2022-06-30 PROCEDURE — 3074F SYST BP LT 130 MM HG: CPT | Performed by: INTERNAL MEDICINE

## 2022-06-30 PROCEDURE — 3078F DIAST BP <80 MM HG: CPT | Performed by: INTERNAL MEDICINE

## 2022-06-30 PROCEDURE — 99495 TRANSJ CARE MGMT MOD F2F 14D: CPT | Performed by: INTERNAL MEDICINE

## 2022-06-30 PROCEDURE — 3008F BODY MASS INDEX DOCD: CPT | Performed by: INTERNAL MEDICINE

## 2022-06-30 RX ORDER — LOSARTAN POTASSIUM AND HYDROCHLOROTHIAZIDE 25; 100 MG/1; MG/1
1 TABLET ORAL DAILY
Qty: 90 TABLET | Refills: 1 | COMMUNITY
Start: 2022-06-30

## 2022-07-21 NOTE — TELEPHONE ENCOUNTER
Last VISIT - 6/30/22 HFU for chest pressure/ tightness    Last CPE - 7/2/21    Last REFILL -     apixaban 5 MG Oral Tab 74 tablet 0 6/24/2022     Last LABS - 6/23/22 cbc, cmp    Per PROTOCOL? None    Please Approve or Deny.

## 2022-07-29 ENCOUNTER — OFFICE VISIT (OUTPATIENT)
Dept: HEMATOLOGY/ONCOLOGY | Age: 62
End: 2022-07-29
Attending: INTERNAL MEDICINE
Payer: COMMERCIAL

## 2022-07-29 VITALS
BODY MASS INDEX: 36.12 KG/M2 | WEIGHT: 230.13 LBS | SYSTOLIC BLOOD PRESSURE: 131 MMHG | HEIGHT: 67.01 IN | DIASTOLIC BLOOD PRESSURE: 85 MMHG | OXYGEN SATURATION: 97 % | TEMPERATURE: 97 F | HEART RATE: 78 BPM

## 2022-07-29 DIAGNOSIS — Z82.49 FAMILY HISTORY OF BLOOD CLOTS: ICD-10-CM

## 2022-07-29 DIAGNOSIS — I26.99 PULMONARY EMBOLISM, BILATERAL (HCC): Primary | ICD-10-CM

## 2022-07-29 DIAGNOSIS — R12 HEARTBURN: ICD-10-CM

## 2022-07-29 DIAGNOSIS — I82.402 LEG DVT (DEEP VENOUS THROMBOEMBOLISM), ACUTE, LEFT (HCC): ICD-10-CM

## 2022-07-29 PROCEDURE — 99245 OFF/OP CONSLTJ NEW/EST HI 55: CPT | Performed by: INTERNAL MEDICINE

## 2022-07-29 RX ORDER — PANTOPRAZOLE SODIUM 40 MG/1
40 TABLET, DELAYED RELEASE ORAL DAILY
Qty: 30 TABLET | Refills: 2 | Status: SHIPPED | OUTPATIENT
Start: 2022-07-29

## 2022-07-29 NOTE — PROGRESS NOTES
Education Record    Learner:  Patient    Disease / Diagnosis: DVT/PE    Barriers / Limitations:  None   Comments:    Method:  Discussion   Comments:    General Topics:  Plan of care reviewed   Comments:    Outcome:  Shows understanding   Comments: Consult referred by PCP. Pt had COVID in may and then one month later was dx with DVT/PE. Pt has been on eliquis as directed without complications. Pt states she still has SOB but not like it was upon dx of PE. Pt states she has been having HA and nausea/heartburn symptoms lately. Order for [address] given to pt.

## 2022-08-30 ENCOUNTER — TELEPHONE (OUTPATIENT)
Dept: HEMATOLOGY/ONCOLOGY | Facility: HOSPITAL | Age: 62
End: 2022-08-30

## 2022-08-30 ENCOUNTER — TELEPHONE (OUTPATIENT)
Dept: INTERNAL MEDICINE CLINIC | Facility: CLINIC | Age: 62
End: 2022-08-30

## 2022-08-30 NOTE — TELEPHONE ENCOUNTER
Pt needs to continue eliquis until sees MD for f/up. Refill sent to [address]. Pt threw out back and wants to take meloxicam or IBU. Per MD ok to take meloxicam for however long patients needs. Slightly higher risk for bleeding, precautions reviewed with pt. She v/u.

## 2022-08-30 NOTE — TELEPHONE ENCOUNTER
Patient called. She was taking Eliquis. Last Wednesday was her last dose. Her pharmacist told her she needs to continue with the Eliquis. Please call patient to clarify.

## 2022-08-30 NOTE — TELEPHONE ENCOUNTER
She saw Dr. Ulisses Sanchez and he said to continue at least 3 months for b/l PE in his note. This would be too soon to stop. She can check back with hematologist but I think she is still suppose to be on it until end of next month.

## 2022-08-30 NOTE — TELEPHONE ENCOUNTER
Called and spoke to pt. Informed her per Dr. Milburn Shone note from 7/29/22, he wanted her to be on Eliquis for at least 3 months. This would be too soon to stop. Advised pt to call Dr. Milburn Shone office to ask about if she is to continue Eliquis. Pt stated understanding and agreed to plan.

## 2022-08-31 NOTE — TELEPHONE ENCOUNTER
Patient states that she spoke with Dr Kassandra Velasquez yesterday, no refills needed as Dr Kassandra Velasquez took care of that, states she has scheduled a Venous US Left Leg for 9/20/22 and then will see Dr Kassandra Velasquez Hem/Onc on 9/22/22. FYI to TB.

## 2022-09-20 ENCOUNTER — HOSPITAL ENCOUNTER (OUTPATIENT)
Dept: ULTRASOUND IMAGING | Facility: HOSPITAL | Age: 62
Discharge: HOME | End: 2022-09-20
Attending: INTERNAL MEDICINE

## 2022-09-20 DIAGNOSIS — I82.402 LEG DVT (DEEP VENOUS THROMBOEMBOLISM), ACUTE, LEFT (HCC): ICD-10-CM

## 2022-09-20 PROCEDURE — 93971 EXTREMITY STUDY: CPT | Performed by: INTERNAL MEDICINE

## 2022-09-22 ENCOUNTER — OFFICE VISIT (OUTPATIENT)
Dept: HEMATOLOGY/ONCOLOGY | Facility: HOSPITAL | Age: 62
End: 2022-09-22
Attending: INTERNAL MEDICINE

## 2022-09-22 VITALS
BODY MASS INDEX: 36.1 KG/M2 | SYSTOLIC BLOOD PRESSURE: 128 MMHG | DIASTOLIC BLOOD PRESSURE: 81 MMHG | WEIGHT: 230 LBS | RESPIRATION RATE: 18 BRPM | TEMPERATURE: 98 F | HEIGHT: 67.01 IN | OXYGEN SATURATION: 96 % | HEART RATE: 80 BPM

## 2022-09-22 DIAGNOSIS — R12 HEARTBURN: ICD-10-CM

## 2022-09-22 DIAGNOSIS — I26.99 PULMONARY EMBOLISM, BILATERAL (HCC): Primary | ICD-10-CM

## 2022-09-22 DIAGNOSIS — I82.402 LEG DVT (DEEP VENOUS THROMBOEMBOLISM), ACUTE, LEFT (HCC): ICD-10-CM

## 2022-09-22 PROCEDURE — 99211 OFF/OP EST MAY X REQ PHY/QHP: CPT

## 2022-12-23 ENCOUNTER — TELEPHONE (OUTPATIENT)
Dept: INTERNAL MEDICINE CLINIC | Facility: CLINIC | Age: 62
End: 2022-12-23

## 2022-12-23 RX ORDER — LOSARTAN POTASSIUM AND HYDROCHLOROTHIAZIDE 12.5; 1 MG/1; MG/1
1 TABLET ORAL DAILY
COMMUNITY
Start: 2022-09-15 | End: 2023-01-22

## 2022-12-23 NOTE — TELEPHONE ENCOUNTER
Refill request received from [facility] Losartan-hydrochlorothiazide 100/12.5 mg daily. There are 2 different doses listed in patients chart:    Losartan/HCTZ 100/12.5 mg 1 tablet daily  Losartan/HCTZ 100/25 mg 1 tablet daily    According to our records, patient's Hydrochlorothiazide was increased from 25 to 12.5. Call placed to patient to confirm dose she is currently taking, no answer, [facility].

## 2022-12-28 NOTE — TELEPHONE ENCOUNTER
[facility] sent to patient to clarify correct Losartan-hydrochlorothiazide dose ; awaiting reply.

## 2023-01-02 ENCOUNTER — HOSPITAL ENCOUNTER (OUTPATIENT)
Age: 63
Discharge: HOME | End: 2023-01-02
Payer: COMMERCIAL

## 2023-01-02 ENCOUNTER — APPOINTMENT (OUTPATIENT)
Dept: GENERAL RADIOLOGY | Age: 63
End: 2023-01-02
Attending: PHYSICIAN ASSISTANT
Payer: COMMERCIAL

## 2023-01-02 VITALS
OXYGEN SATURATION: 99 % | WEIGHT: 220 LBS | TEMPERATURE: 98 F | RESPIRATION RATE: 16 BRPM | HEART RATE: 71 BPM | HEIGHT: 67 IN | SYSTOLIC BLOOD PRESSURE: 180 MMHG | DIASTOLIC BLOOD PRESSURE: 111 MMHG | BODY MASS INDEX: 34.53 KG/M2

## 2023-01-02 DIAGNOSIS — M25.572 ACUTE LEFT ANKLE PAIN: Primary | ICD-10-CM

## 2023-01-02 PROCEDURE — 73610 X-RAY EXAM OF ANKLE: CPT | Performed by: PHYSICIAN ASSISTANT

## 2023-01-02 PROCEDURE — 99213 OFFICE O/P EST LOW 20 MIN: CPT

## 2023-01-02 PROCEDURE — 99214 OFFICE O/P EST MOD 30 MIN: CPT

## 2023-01-02 RX ORDER — METHYLPREDNISOLONE 4 MG/1
TABLET ORAL
Qty: 1 EACH | Refills: 0 | Status: SHIPPED | OUTPATIENT
Start: 2023-01-02

## 2023-01-02 NOTE — DISCHARGE INSTRUCTIONS
Wear stirrup splint during the day with an athletic sneaker. Elevate and ice the ankle. Naproxen twice daily.   Orthopedic specialty for failure to improve

## 2023-01-02 NOTE — ED INITIAL ASSESSMENT (HPI)
Left foot pain starting last week while walking. Reports feeling a \"pop\" when she rolls her ankle.

## 2023-01-03 ENCOUNTER — TELEPHONE (OUTPATIENT)
Dept: INTERNAL MEDICINE CLINIC | Facility: CLINIC | Age: 63
End: 2023-01-03

## 2023-01-03 NOTE — TELEPHONE ENCOUNTER
Called and spoke to pt. Seen in  1/2/23 for foot pain. BP elevated at . Today is 146/77. Denies any sxs. Taking Losartan as prescribed. Scheduled for BP f/u with SD 1/5/23.      FYI to SD.

## 2023-01-03 NOTE — TELEPHONE ENCOUNTER
Patient calling was seen at [address] [place] [address] yesterday and her Bp was 180/111 and now today it is 146/77. Patient is scheduled to see SD.     Future Appointments   Date Time Provider Vibha Simon   1/5/2023 11:40 AM ISA Suresh [facility]

## 2023-01-05 ENCOUNTER — OFFICE VISIT (OUTPATIENT)
Dept: INTERNAL MEDICINE CLINIC | Facility: CLINIC | Age: 63
End: 2023-01-05
Payer: COMMERCIAL

## 2023-01-05 ENCOUNTER — LAB ENCOUNTER (OUTPATIENT)
Dept: LAB | Age: 63
End: 2023-01-05
Attending: NURSE PRACTITIONER
Payer: COMMERCIAL

## 2023-01-05 VITALS
WEIGHT: 230 LBS | HEART RATE: 64 BPM | OXYGEN SATURATION: 100 % | BODY MASS INDEX: 36.1 KG/M2 | DIASTOLIC BLOOD PRESSURE: 90 MMHG | SYSTOLIC BLOOD PRESSURE: 152 MMHG | HEIGHT: 67 IN

## 2023-01-05 DIAGNOSIS — I10 ESSENTIAL HYPERTENSION: Primary | ICD-10-CM

## 2023-01-05 DIAGNOSIS — I10 ESSENTIAL HYPERTENSION: ICD-10-CM

## 2023-01-05 PROBLEM — I26.99 PULMONARY INFARCT (HCC): Status: RESOLVED | Noted: 2022-06-23 | Resolved: 2023-01-05

## 2023-01-05 PROBLEM — Z86.718 HISTORY OF DVT IN ADULTHOOD: Status: ACTIVE | Noted: 2022-06-23

## 2023-01-05 PROBLEM — Z86.711 HISTORY OF PULMONARY EMBOLISM: Status: ACTIVE | Noted: 2022-06-23

## 2023-01-05 LAB
ALBUMIN SERPL-MCNC: 3.9 G/DL (ref 3.4–5)
ALBUMIN/GLOB SERPL: 1.1 {RATIO} (ref 1–2)
ALP LIVER SERPL-CCNC: 73 U/L
ALT SERPL-CCNC: 36 U/L
ANION GAP SERPL CALC-SCNC: 1 MMOL/L (ref 0–18)
AST SERPL-CCNC: 21 U/L (ref 15–37)
BILIRUB SERPL-MCNC: 0.5 MG/DL (ref 0.1–2)
BUN BLD-MCNC: 20 MG/DL (ref 7–18)
CALCIUM BLD-MCNC: 10 MG/DL (ref 8.5–10.1)
CHLORIDE SERPL-SCNC: 106 MMOL/L (ref 98–112)
CO2 SERPL-SCNC: 31 MMOL/L (ref 21–32)
CREAT BLD-MCNC: 0.89 MG/DL
FASTING STATUS PATIENT QL REPORTED: NO
GFR SERPLBLD BASED ON 1.73 SQ M-ARVRAT: 73 ML/MIN/1.73M2 (ref 60–?)
GLOBULIN PLAS-MCNC: 3.4 G/DL (ref 2.8–4.4)
GLUCOSE BLD-MCNC: 101 MG/DL (ref 70–99)
OSMOLALITY SERPL CALC.SUM OF ELEC: 289 MOSM/KG (ref 275–295)
POTASSIUM SERPL-SCNC: 4.1 MMOL/L (ref 3.5–5.1)
PROT SERPL-MCNC: 7.3 G/DL (ref 6.4–8.2)
SODIUM SERPL-SCNC: 138 MMOL/L (ref 136–145)

## 2023-01-05 PROCEDURE — 3080F DIAST BP >= 90 MM HG: CPT | Performed by: NURSE PRACTITIONER

## 2023-01-05 PROCEDURE — 99213 OFFICE O/P EST LOW 20 MIN: CPT | Performed by: NURSE PRACTITIONER

## 2023-01-05 PROCEDURE — 3077F SYST BP >= 140 MM HG: CPT | Performed by: NURSE PRACTITIONER

## 2023-01-05 PROCEDURE — 3008F BODY MASS INDEX DOCD: CPT | Performed by: NURSE PRACTITIONER

## 2023-01-05 PROCEDURE — 80053 COMPREHEN METABOLIC PANEL: CPT | Performed by: NURSE PRACTITIONER

## 2023-01-05 RX ORDER — AMLODIPINE BESYLATE 2.5 MG/1
2.5 TABLET ORAL DAILY
Qty: 90 TABLET | Refills: 1 | Status: SHIPPED | OUTPATIENT
Start: 2023-01-05

## 2023-01-22 RX ORDER — LOSARTAN POTASSIUM AND HYDROCHLOROTHIAZIDE 12.5; 1 MG/1; MG/1
1 TABLET ORAL DAILY
Qty: 90 TABLET | Refills: 0 | Status: SHIPPED | OUTPATIENT
Start: 2023-01-22

## 2023-03-08 RX ORDER — AMLODIPINE BESYLATE 5 MG/1
5 TABLET ORAL DAILY
Qty: 90 TABLET | Refills: 0 | Status: SHIPPED | OUTPATIENT
Start: 2023-03-08

## 2023-03-08 NOTE — TELEPHONE ENCOUNTER
Due for CPE and will be due for labs in June. Please have her schedule CPE around that time and will have labs completed as well.   Thanks

## 2023-03-08 NOTE — TELEPHONE ENCOUNTER
PASSED per protocol. Last PE: 7/2/2021--physical    No future appointments.      Last refill:1/25/2023-n/a

## 2023-04-10 ENCOUNTER — TELEPHONE (OUTPATIENT)
Dept: INTERNAL MEDICINE CLINIC | Facility: CLINIC | Age: 63
End: 2023-04-10

## 2023-04-10 DIAGNOSIS — Z13.220 SCREENING FOR LIPID DISORDERS: ICD-10-CM

## 2023-04-10 DIAGNOSIS — Z00.00 ROUTINE GENERAL MEDICAL EXAMINATION AT A HEALTH CARE FACILITY: ICD-10-CM

## 2023-04-10 DIAGNOSIS — Z13.29 SCREENING FOR THYROID DISORDER: ICD-10-CM

## 2023-04-10 DIAGNOSIS — Z13.228 SCREENING FOR METABOLIC DISORDER: ICD-10-CM

## 2023-04-10 DIAGNOSIS — Z13.0 SCREENING FOR DISORDER OF BLOOD AND BLOOD-FORMING ORGANS: ICD-10-CM

## 2023-04-10 NOTE — TELEPHONE ENCOUNTER
Pt is scheduled for cpe . Last cpe was 7/21. Future Appointments   Date Time Provider Vibha Simon   4/24/2023  7:20 AM ISA Virk [facility]     Informed must fast Orders to THE [facility] pt is requesting vitamin D and would like a call back if able to complete.

## 2023-04-24 ENCOUNTER — LAB ENCOUNTER (OUTPATIENT)
Dept: LAB | Age: 63
End: 2023-04-24
Attending: NURSE PRACTITIONER
Payer: COMMERCIAL

## 2023-04-24 DIAGNOSIS — Z00.00 ROUTINE GENERAL MEDICAL EXAMINATION AT A HEALTH CARE FACILITY: ICD-10-CM

## 2023-04-24 DIAGNOSIS — Z13.0 SCREENING FOR DISORDER OF BLOOD AND BLOOD-FORMING ORGANS: ICD-10-CM

## 2023-04-24 DIAGNOSIS — R73.9 HYPERGLYCEMIA: Primary | ICD-10-CM

## 2023-04-24 DIAGNOSIS — Z13.29 SCREENING FOR THYROID DISORDER: ICD-10-CM

## 2023-04-24 DIAGNOSIS — Z13.228 SCREENING FOR METABOLIC DISORDER: ICD-10-CM

## 2023-04-24 DIAGNOSIS — Z13.220 SCREENING FOR LIPID DISORDERS: ICD-10-CM

## 2023-04-24 DIAGNOSIS — R73.9 HYPERGLYCEMIA: ICD-10-CM

## 2023-04-24 LAB
ALBUMIN SERPL-MCNC: 3.8 G/DL (ref 3.4–5)
ALBUMIN/GLOB SERPL: 1.1 {RATIO} (ref 1–2)
ALP LIVER SERPL-CCNC: 69 U/L
ALT SERPL-CCNC: 26 U/L
ANION GAP SERPL CALC-SCNC: 2 MMOL/L (ref 0–18)
AST SERPL-CCNC: 22 U/L (ref 15–37)
BASOPHILS # BLD AUTO: 0.05 X10(3) UL (ref 0–0.2)
BASOPHILS NFR BLD AUTO: 0.8 %
BILIRUB SERPL-MCNC: 0.7 MG/DL (ref 0.1–2)
BUN BLD-MCNC: 16 MG/DL (ref 7–18)
CALCIUM BLD-MCNC: 9.8 MG/DL (ref 8.5–10.1)
CHLORIDE SERPL-SCNC: 110 MMOL/L (ref 98–112)
CHOLEST SERPL-MCNC: 162 MG/DL (ref ?–200)
CO2 SERPL-SCNC: 30 MMOL/L (ref 21–32)
CREAT BLD-MCNC: 1.04 MG/DL
EOSINOPHIL # BLD AUTO: 0.12 X10(3) UL (ref 0–0.7)
EOSINOPHIL NFR BLD AUTO: 1.9 %
ERYTHROCYTE [DISTWIDTH] IN BLOOD BY AUTOMATED COUNT: 12.8 %
EST. AVERAGE GLUCOSE BLD GHB EST-MCNC: 108 MG/DL (ref 68–126)
FASTING PATIENT LIPID ANSWER: YES
FASTING STATUS PATIENT QL REPORTED: YES
GFR SERPLBLD BASED ON 1.73 SQ M-ARVRAT: 61 ML/MIN/1.73M2 (ref 60–?)
GLOBULIN PLAS-MCNC: 3.5 G/DL (ref 2.8–4.4)
GLUCOSE BLD-MCNC: 117 MG/DL (ref 70–99)
HBA1C MFR BLD: 5.4 % (ref ?–5.7)
HCT VFR BLD AUTO: 44.1 %
HDLC SERPL-MCNC: 51 MG/DL (ref 40–59)
HGB BLD-MCNC: 15 G/DL
IMM GRANULOCYTES # BLD AUTO: 0.01 X10(3) UL (ref 0–1)
IMM GRANULOCYTES NFR BLD: 0.2 %
LDLC SERPL CALC-MCNC: 88 MG/DL (ref ?–100)
LYMPHOCYTES # BLD AUTO: 1.74 X10(3) UL (ref 1–4)
LYMPHOCYTES NFR BLD AUTO: 27.1 %
MCH RBC QN AUTO: 29.9 PG (ref 26–34)
MCHC RBC AUTO-ENTMCNC: 34 G/DL (ref 31–37)
MCV RBC AUTO: 88 FL
MONOCYTES # BLD AUTO: 0.59 X10(3) UL (ref 0.1–1)
MONOCYTES NFR BLD AUTO: 9.2 %
NEUTROPHILS # BLD AUTO: 3.91 X10 (3) UL (ref 1.5–7.7)
NEUTROPHILS # BLD AUTO: 3.91 X10(3) UL (ref 1.5–7.7)
NEUTROPHILS NFR BLD AUTO: 60.8 %
NONHDLC SERPL-MCNC: 111 MG/DL (ref ?–130)
OSMOLALITY SERPL CALC.SUM OF ELEC: 296 MOSM/KG (ref 275–295)
PLATELET # BLD AUTO: 258 10(3)UL (ref 150–450)
POTASSIUM SERPL-SCNC: 4.6 MMOL/L (ref 3.5–5.1)
PROT SERPL-MCNC: 7.3 G/DL (ref 6.4–8.2)
RBC # BLD AUTO: 5.01 X10(6)UL
SODIUM SERPL-SCNC: 142 MMOL/L (ref 136–145)
TRIGL SERPL-MCNC: 130 MG/DL (ref 30–149)
TSI SER-ACNC: 1.24 MIU/ML (ref 0.36–3.74)
VLDLC SERPL CALC-MCNC: 21 MG/DL (ref 0–30)
WBC # BLD AUTO: 6.4 X10(3) UL (ref 4–11)

## 2023-04-24 PROCEDURE — 80050 GENERAL HEALTH PANEL: CPT | Performed by: NURSE PRACTITIONER

## 2023-04-24 PROCEDURE — 80061 LIPID PANEL: CPT | Performed by: NURSE PRACTITIONER

## 2023-04-24 PROCEDURE — 83036 HEMOGLOBIN GLYCOSYLATED A1C: CPT | Performed by: NURSE PRACTITIONER

## 2023-05-02 ENCOUNTER — OFFICE VISIT (OUTPATIENT)
Dept: INTERNAL MEDICINE CLINIC | Facility: CLINIC | Age: 63
End: 2023-05-02
Payer: COMMERCIAL

## 2023-05-02 VITALS
WEIGHT: 218.19 LBS | OXYGEN SATURATION: 99 % | RESPIRATION RATE: 14 BRPM | DIASTOLIC BLOOD PRESSURE: 62 MMHG | BODY MASS INDEX: 34.25 KG/M2 | SYSTOLIC BLOOD PRESSURE: 122 MMHG | HEIGHT: 67 IN | HEART RATE: 70 BPM

## 2023-05-02 DIAGNOSIS — Z86.718 HISTORY OF DVT IN ADULTHOOD: ICD-10-CM

## 2023-05-02 DIAGNOSIS — E04.2 MULTIPLE THYROID NODULES: ICD-10-CM

## 2023-05-02 DIAGNOSIS — I10 ESSENTIAL HYPERTENSION: ICD-10-CM

## 2023-05-02 DIAGNOSIS — G43.001 MIGRAINE WITHOUT AURA AND WITH STATUS MIGRAINOSUS, NOT INTRACTABLE: ICD-10-CM

## 2023-05-02 DIAGNOSIS — Z00.00 ROUTINE GENERAL MEDICAL EXAMINATION AT A HEALTH CARE FACILITY: Primary | ICD-10-CM

## 2023-05-02 DIAGNOSIS — R73.9 HYPERGLYCEMIA: ICD-10-CM

## 2023-05-02 DIAGNOSIS — Z82.49 FAMILY HISTORY OF BLOOD CLOTS: ICD-10-CM

## 2023-05-02 DIAGNOSIS — L71.9 ROSACEA: ICD-10-CM

## 2023-05-02 DIAGNOSIS — Z86.711 HISTORY OF PULMONARY EMBOLISM: ICD-10-CM

## 2023-05-02 DIAGNOSIS — R25.1 TREMOR: ICD-10-CM

## 2023-05-02 PROBLEM — Z86.16 PERSONAL HISTORY OF COVID-19: Status: RESOLVED | Noted: 2022-06-30 | Resolved: 2023-05-02

## 2023-05-02 PROBLEM — R12 HEARTBURN: Status: RESOLVED | Noted: 2022-07-29 | Resolved: 2023-05-02

## 2023-05-02 PROCEDURE — 3074F SYST BP LT 130 MM HG: CPT | Performed by: NURSE PRACTITIONER

## 2023-05-02 PROCEDURE — 3008F BODY MASS INDEX DOCD: CPT | Performed by: NURSE PRACTITIONER

## 2023-05-02 PROCEDURE — 99396 PREV VISIT EST AGE 40-64: CPT | Performed by: NURSE PRACTITIONER

## 2023-05-02 PROCEDURE — 3078F DIAST BP <80 MM HG: CPT | Performed by: NURSE PRACTITIONER

## 2023-05-02 RX ORDER — LOSARTAN POTASSIUM AND HYDROCHLOROTHIAZIDE 12.5; 1 MG/1; MG/1
1 TABLET ORAL DAILY
Qty: 90 TABLET | Refills: 3 | Status: SHIPPED | OUTPATIENT
Start: 2023-05-02

## 2023-05-02 RX ORDER — MONTELUKAST SODIUM 10 MG/1
10 TABLET ORAL NIGHTLY
Qty: 90 TABLET | Refills: 3 | Status: SHIPPED | OUTPATIENT
Start: 2023-05-02

## 2023-05-02 RX ORDER — FLUTICASONE PROPIONATE 50 MCG
1 SPRAY, SUSPENSION (ML) NASAL 2 TIMES DAILY
Qty: 3 EACH | Refills: 3 | Status: SHIPPED | OUTPATIENT
Start: 2023-05-02

## 2023-05-02 RX ORDER — AMLODIPINE BESYLATE 5 MG/1
5 TABLET ORAL DAILY
Qty: 90 TABLET | Refills: 3 | Status: SHIPPED | OUTPATIENT
Start: 2023-05-02

## 2023-09-08 ENCOUNTER — OFFICE VISIT (OUTPATIENT)
Dept: INTERNAL MEDICINE CLINIC | Facility: CLINIC | Age: 63
End: 2023-09-08
Payer: COMMERCIAL

## 2023-09-08 ENCOUNTER — TELEPHONE (OUTPATIENT)
Dept: INTERNAL MEDICINE CLINIC | Facility: CLINIC | Age: 63
End: 2023-09-08

## 2023-09-08 VITALS
SYSTOLIC BLOOD PRESSURE: 124 MMHG | HEIGHT: 66.14 IN | WEIGHT: 220.19 LBS | RESPIRATION RATE: 16 BRPM | BODY MASS INDEX: 35.39 KG/M2 | TEMPERATURE: 98 F | DIASTOLIC BLOOD PRESSURE: 68 MMHG | OXYGEN SATURATION: 98 % | HEART RATE: 72 BPM

## 2023-09-08 DIAGNOSIS — J02.9 SORE THROAT: Primary | ICD-10-CM

## 2023-09-08 DIAGNOSIS — R30.9 PAIN WITH URINATION: ICD-10-CM

## 2023-09-08 DIAGNOSIS — J01.00 ACUTE NON-RECURRENT MAXILLARY SINUSITIS: ICD-10-CM

## 2023-09-08 LAB
APPEARANCE: CLEAR
BILIRUBIN: NEGATIVE
CONTROL LINE PRESENT WITH A CLEAR BACKGROUND (YES/NO): YES YES/NO
GLUCOSE (URINE DIPSTICK): NEGATIVE MG/DL
KETONES (URINE DIPSTICK): NEGATIVE MG/DL
KIT LOT #: 6668 NUMERIC
LEUKOCYTES: NEGATIVE
MULTISTIX LOT#: ABNORMAL NUMERIC
NITRITE, URINE: NEGATIVE
PH, URINE: 6 (ref 4.5–8)
PROTEIN (URINE DIPSTICK): NEGATIVE MG/DL
SPECIFIC GRAVITY: 1.03 (ref 1–1.03)
STREP GRP A CUL-SCR: NEGATIVE
URINE-COLOR: YELLOW
UROBILINOGEN,SEMI-QN: 0.2 MG/DL (ref 0–1.9)

## 2023-09-08 PROCEDURE — 87081 CULTURE SCREEN ONLY: CPT | Performed by: PHYSICIAN ASSISTANT

## 2023-09-08 PROCEDURE — 3074F SYST BP LT 130 MM HG: CPT | Performed by: PHYSICIAN ASSISTANT

## 2023-09-08 PROCEDURE — 3078F DIAST BP <80 MM HG: CPT | Performed by: PHYSICIAN ASSISTANT

## 2023-09-08 PROCEDURE — 3008F BODY MASS INDEX DOCD: CPT | Performed by: PHYSICIAN ASSISTANT

## 2023-09-08 PROCEDURE — 87880 STREP A ASSAY W/OPTIC: CPT | Performed by: PHYSICIAN ASSISTANT

## 2023-09-08 PROCEDURE — 99214 OFFICE O/P EST MOD 30 MIN: CPT | Performed by: PHYSICIAN ASSISTANT

## 2023-09-08 PROCEDURE — 81003 URINALYSIS AUTO W/O SCOPE: CPT | Performed by: PHYSICIAN ASSISTANT

## 2023-09-08 RX ORDER — AMOXICILLIN AND CLAVULANATE POTASSIUM 875; 125 MG/1; MG/1
1 TABLET, FILM COATED ORAL 2 TIMES DAILY
Qty: 20 TABLET | Refills: 0 | Status: SHIPPED | OUTPATIENT
Start: 2023-09-08 | End: 2023-09-18

## 2023-09-08 NOTE — TELEPHONE ENCOUNTER
Pt stated for a week now she's been sick. Her  had a bad cold and then she got it. Now she believes she has strep throat as her throat is really sore and when she blows her nose she has yellow mucus. Pt looking for an appt, none available.

## 2023-09-08 NOTE — TELEPHONE ENCOUNTER
Called and spoke to pt. Pt said that she had cold sxs that started last week. Has taken multiple covid tests and all have been neg. She was starting to feel better yesterday, but when she woke up today, her \"throat felt like razor blades\". Offered appt with CS this afternoon for eval/testing. Pt agreeable and thankful. Pt then added she has also been having some pain prior to urination, asked if we can check urine when she is here.      JOSE DAMIAN

## 2023-10-04 ENCOUNTER — TELEPHONE (OUTPATIENT)
Dept: INTERNAL MEDICINE CLINIC | Facility: CLINIC | Age: 63
End: 2023-10-04

## 2023-10-04 ENCOUNTER — TELEMEDICINE (OUTPATIENT)
Dept: INTERNAL MEDICINE CLINIC | Facility: CLINIC | Age: 63
End: 2023-10-04
Payer: COMMERCIAL

## 2023-10-04 DIAGNOSIS — J01.00 ACUTE NON-RECURRENT MAXILLARY SINUSITIS: Primary | ICD-10-CM

## 2023-10-04 PROCEDURE — 99441 PHONE E/M BY PHYS 5-10 MIN: CPT | Performed by: PHYSICIAN ASSISTANT

## 2023-10-04 RX ORDER — DOXYCYCLINE HYCLATE 100 MG/1
100 CAPSULE ORAL 2 TIMES DAILY
Qty: 20 CAPSULE | Refills: 0 | Status: SHIPPED | OUTPATIENT
Start: 2023-10-04

## 2023-10-04 NOTE — TELEPHONE ENCOUNTER
Pt stated her ears and eyes are leaking and feel pressure, has a sore throat too. Pt had sxs two weeks ago, got better - took antibiotics and now they're back. Negative covid test yesterday. Pt stated she's had a fever at night - it's about 100. Pt stated she took the below meds but want something stronger. amoxicillin clavulanate 875-125 MG Oral Tab () 20 tablet 0 2023   Sig:   Take 1 tablet by mouth 2 (two) times daily for 10 days.      Route:   Oral

## 2023-10-04 NOTE — TELEPHONE ENCOUNTER
Pt scheduled with CS for VV today  Future Appointments   Date Time Provider Vibha Simon   10/4/2023 10:00 AM Sean Santillan., BONNY [facility]

## 2023-10-04 NOTE — PROGRESS NOTES
Familia Thomas is a 58year old female. HPI:    Pt presents for f/u. She was seen for sinusitis on 9/8/23 and was treated with augmentin. She felt better initially but now symptoms have returned the last week. Nasal congestion, sinus pain/pressure, drainage from eyes, drainage from ears, ear pressure, low grade fever with Tmax 100. Denies SOB. Using flonase. Allergies:    Shellfish               NAUSEA AND VOMITING   Current Meds:  Current Outpatient Medications   Medication Sig Dispense Refill    doxycycline 100 MG Oral Cap Take 1 capsule (100 mg total) by mouth 2 (two) times daily. 20 capsule 0    triamcinolone 0.1 % External Ointment Apply 0.1 % topically as needed. amLODIPine 5 MG Oral Tab Take 1 tablet (5 mg total) by mouth daily. 90 tablet 3    Losartan Potassium-HCTZ 100-12.5 MG Oral Tab Take 1 tablet by mouth daily. 90 tablet 3    montelukast 10 MG Oral Tab Take 1 tablet (10 mg total) by mouth nightly. 90 tablet 3    fluticasone propionate 50 MCG/ACT Nasal Suspension 1 spray by Each Nare route in the morning and 1 spray before bedtime. 3 each 3    Multiple Vitamins-Minerals (CENTRUM SILVER) Oral Tab Take 1 tablet by mouth daily. Vitamin D, Cholecalciferol, 50 MCG (2000 UT) Oral Cap Take 2,000 Units by mouth daily. PMH:     Past Medical History:   Diagnosis Date    Acute pharyngitis     Leg DVT (deep venous thromboembolism), acute, left ([facility] [address] 6/23/2022    Personal history of pneumonia (recurrent)     Pulmonary embolism, bilateral ([facility] [address] 6/23/2022    Unspecified essential hypertension          ROS:   Review of Systems   Constitutional:  Positive for fatigue and fever. Negative for chills. HENT:  Positive for congestion, ear discharge, postnasal drip, rhinorrhea, sinus pressure and sinus pain. Respiratory:  Positive for cough. Negative for shortness of breath and wheezing. Cardiovascular:  Negative for chest pain.    Neurological:  Negative for dizziness, light-headedness and headaches. PHYSICAL EXAM:   No vital signs or physical exam completed for this visit as visit was done via telehealth. ASSESSMENT/ PLAN:   1. Acute non-recurrent maxillary sinusitis  Treat with doxycycline bid x 10 days   Continue flonase   F/u if continues to persist    Pt was unable to connect via video, thus visit was completed via phone instead. Total time: 6 minutes. COVID-19 Vaccine(1) Never done  Zoster Vaccines(1 of 2) Never done  Mammogram due on 01/25/2023  Influenza Vaccine(1) Never done      Pt indicates understanding and agrees to the plan. No follow-ups on file. BONNY Brownlee understands phone evaluation is not a substitute for face-to-face examination or emergency care. Patient advised to go to ER or call 911 for worsening symptoms or acute distress. Please note that the following visit was completed using two-way, real-time interactive audio communication. This has been done in good paul to provide continuity of care in the best interest of the provider-patient relationship, due to the on-going public health crisis/national emergency and because of restrictions of visitation. There are limitations of this visit as no physical exam could be performed. Every conscious effort was taken to allow for sufficient and adequate time. This billing visit was spent on reviewing labs, medications, radiology tests and decision making. Appropriate medical decision-making and tests are ordered as detailed in the plan of care above.

## 2023-10-13 ENCOUNTER — TELEPHONE (OUTPATIENT)
Dept: INTERNAL MEDICINE CLINIC | Facility: CLINIC | Age: 63
End: 2023-10-13

## 2023-10-13 NOTE — TELEPHONE ENCOUNTER
Patient states she has had a cold/cough and on the last day of antibiotics(2nd round).  Patient has a sore throat but no fever.

## 2023-10-17 ENCOUNTER — OFFICE VISIT (OUTPATIENT)
Dept: INTERNAL MEDICINE CLINIC | Facility: CLINIC | Age: 63
End: 2023-10-17
Payer: COMMERCIAL

## 2023-10-17 VITALS
WEIGHT: 222 LBS | DIASTOLIC BLOOD PRESSURE: 62 MMHG | BODY MASS INDEX: 35.68 KG/M2 | RESPIRATION RATE: 12 BRPM | OXYGEN SATURATION: 96 % | SYSTOLIC BLOOD PRESSURE: 118 MMHG | HEIGHT: 66 IN | HEART RATE: 74 BPM

## 2023-10-17 DIAGNOSIS — R05.2 SUBACUTE COUGH: ICD-10-CM

## 2023-10-17 DIAGNOSIS — R10.2 PELVIC PRESSURE IN FEMALE: ICD-10-CM

## 2023-10-17 DIAGNOSIS — I10 ESSENTIAL HYPERTENSION: ICD-10-CM

## 2023-10-17 DIAGNOSIS — R09.81 SINUS CONGESTION: Primary | ICD-10-CM

## 2023-10-17 LAB
BILIRUB UR QL STRIP.AUTO: NEGATIVE
CLARITY UR REFRACT.AUTO: CLEAR
GLUCOSE UR STRIP.AUTO-MCNC: NORMAL MG/DL
KETONES UR STRIP.AUTO-MCNC: NEGATIVE MG/DL
LEUKOCYTE ESTERASE UR QL STRIP.AUTO: NEGATIVE
NITRITE UR QL STRIP.AUTO: NEGATIVE
PH UR STRIP.AUTO: 7 [PH] (ref 5–8)
PROT UR STRIP.AUTO-MCNC: NEGATIVE MG/DL
RBC UR QL AUTO: NEGATIVE
SP GR UR STRIP.AUTO: 1.02 (ref 1–1.03)
UROBILINOGEN UR STRIP.AUTO-MCNC: NORMAL MG/DL

## 2023-10-17 PROCEDURE — 81003 URINALYSIS AUTO W/O SCOPE: CPT | Performed by: NURSE PRACTITIONER

## 2023-10-17 PROCEDURE — 3078F DIAST BP <80 MM HG: CPT | Performed by: NURSE PRACTITIONER

## 2023-10-17 PROCEDURE — 3008F BODY MASS INDEX DOCD: CPT | Performed by: NURSE PRACTITIONER

## 2023-10-17 PROCEDURE — 99214 OFFICE O/P EST MOD 30 MIN: CPT | Performed by: NURSE PRACTITIONER

## 2023-10-17 PROCEDURE — 3074F SYST BP LT 130 MM HG: CPT | Performed by: NURSE PRACTITIONER

## 2023-10-17 RX ORDER — MONTELUKAST SODIUM 10 MG/1
10 TABLET ORAL NIGHTLY
Qty: 90 TABLET | Refills: 3 | Status: SHIPPED | OUTPATIENT
Start: 2023-10-17

## 2023-10-17 RX ORDER — PREDNISONE 10 MG/1
TABLET ORAL
Qty: 15 TABLET | Refills: 0 | Status: SHIPPED | OUTPATIENT
Start: 2023-10-17 | End: 2023-10-27

## 2023-10-17 RX ORDER — CEFUROXIME AXETIL 500 MG/1
500 TABLET ORAL 2 TIMES DAILY
Qty: 20 TABLET | Refills: 0 | Status: SHIPPED | OUTPATIENT
Start: 2023-10-17

## 2023-10-26 ENCOUNTER — HOSPITAL ENCOUNTER (OUTPATIENT)
Dept: ULTRASOUND IMAGING | Age: 63
Discharge: HOME | End: 2023-10-26
Attending: NURSE PRACTITIONER

## 2023-10-26 DIAGNOSIS — R10.2 PELVIC PRESSURE IN FEMALE: ICD-10-CM

## 2023-10-26 PROCEDURE — 76856 US EXAM PELVIC COMPLETE: CPT | Performed by: NURSE PRACTITIONER

## 2023-10-26 PROCEDURE — 76830 TRANSVAGINAL US NON-OB: CPT | Performed by: NURSE PRACTITIONER

## 2023-11-17 ENCOUNTER — OFFICE VISIT (OUTPATIENT)
Dept: INTERNAL MEDICINE CLINIC | Facility: CLINIC | Age: 63
End: 2023-11-17
Payer: COMMERCIAL

## 2023-11-17 VITALS
BODY MASS INDEX: 35.73 KG/M2 | RESPIRATION RATE: 20 BRPM | WEIGHT: 225 LBS | HEIGHT: 66.54 IN | TEMPERATURE: 99 F | OXYGEN SATURATION: 99 % | SYSTOLIC BLOOD PRESSURE: 134 MMHG | HEART RATE: 94 BPM | DIASTOLIC BLOOD PRESSURE: 82 MMHG

## 2023-11-17 DIAGNOSIS — R09.89 RUNNY NOSE: ICD-10-CM

## 2023-11-17 DIAGNOSIS — R05.1 ACUTE COUGH: ICD-10-CM

## 2023-11-17 DIAGNOSIS — R50.9 FEVER AND CHILLS: Primary | ICD-10-CM

## 2023-11-17 PROCEDURE — 99213 OFFICE O/P EST LOW 20 MIN: CPT | Performed by: INTERNAL MEDICINE

## 2023-11-17 PROCEDURE — 3079F DIAST BP 80-89 MM HG: CPT | Performed by: INTERNAL MEDICINE

## 2023-11-17 PROCEDURE — 3008F BODY MASS INDEX DOCD: CPT | Performed by: INTERNAL MEDICINE

## 2023-11-17 PROCEDURE — 3075F SYST BP GE 130 - 139MM HG: CPT | Performed by: INTERNAL MEDICINE

## 2023-11-17 PROCEDURE — 87637 SARSCOV2&INF A&B&RSV AMP PRB: CPT | Performed by: INTERNAL MEDICINE

## 2023-11-17 RX ORDER — CLINDAMYCIN PHOSPHATE 10 UG/ML
2 LOTION TOPICAL 2 TIMES DAILY
COMMUNITY
Start: 2023-09-13

## 2023-11-17 RX ORDER — BENZONATATE 100 MG/1
100 CAPSULE ORAL 3 TIMES DAILY PRN
Qty: 30 CAPSULE | Refills: 0 | Status: SHIPPED | OUTPATIENT
Start: 2023-11-17

## 2023-11-18 LAB
FLUAV + FLUBV RNA SPEC NAA+PROBE: NOT DETECTED
FLUAV + FLUBV RNA SPEC NAA+PROBE: NOT DETECTED
RSV RNA SPEC NAA+PROBE: NOT DETECTED
SARS-COV-2 RNA RESP QL NAA+PROBE: NOT DETECTED

## 2023-11-20 ENCOUNTER — TELEPHONE (OUTPATIENT)
Dept: INTERNAL MEDICINE CLINIC | Facility: CLINIC | Age: 63
End: 2023-11-20

## 2023-11-20 RX ORDER — AMOXICILLIN AND CLAVULANATE POTASSIUM 875; 125 MG/1; MG/1
1 TABLET, FILM COATED ORAL 2 TIMES DAILY
Qty: 20 TABLET | Refills: 0 | Status: SHIPPED | OUTPATIENT
Start: 2023-11-20 | End: 2023-12-19

## 2023-11-20 NOTE — TELEPHONE ENCOUNTER
Patient states Augmentin will be fine for her to cover sinusitis.  Sterling Bender/Joe on file.  Pt verbalizes understanding.    TB, ok to send?

## 2023-11-20 NOTE — TELEPHONE ENCOUNTER
Pt returned call. Per pt, she feels worse than she did on Friday. Temp will start lower at beginning of day, but then will rise throughout the day. Taking tylenol and motrin, temp will respond but having to alternate meds consistently. Using Flonase and tessalon pearls with minimal relief. Pt said she has been coughing a lot and ribs hurt because of this. Denies SOB. Phlegm now yellow, was clear at OV on Friday.     Routing to TB for review and recs.

## 2023-11-20 NOTE — TELEPHONE ENCOUNTER
Patient seen by TB on 11/17/23 and is getting worse.  Pt has fever 102.5 has been taking motrin and tylenol, has rib pain from coughing.  Patient asking for recommendations,

## 2023-11-22 NOTE — TELEPHONE ENCOUNTER
Called and spoke w/ pt. Pt stated her cough is getting bad and doesn't feel like the meds are helping. Pt was seen by TB 11/17 and was prescribed Augmentin 11/20. Notified pt can take time for abx to start working and should complete the full 10 day course. Pt stated she thought by now the high fevers would be gone. Pt stated temp today at 2/3AM was 101. Has been taking Tylenol and Motrin around the clock. For cough, she has been taking Tessalon pearls, cough drops, hot tea and honey. Pt denies SOB currently, but states when she has a coughing fit she feels like she can't catch her breath. Notified pt I am on the fence about whether UC or see how symptoms progress with abx. Pt stated she will see how the day goes and will go to UC if she feels worse. Notified if she develops any persistent SOB, chest pain to go right to ER. Pt verbalizes understanding. Offered f/u apt next week. Scheduled pt 11/28/23 with TB. Pt stated she will cancel apt if she is better. Pt agreeable to plan, will monitor symptoms today and if she feels worse, will be seen in UC. Provided ER warnings. Pt appreciative of call.     JOSE MAZARIEGOS

## 2023-11-28 ENCOUNTER — OFFICE VISIT (OUTPATIENT)
Dept: INTERNAL MEDICINE CLINIC | Facility: CLINIC | Age: 63
End: 2023-11-28
Payer: COMMERCIAL

## 2023-11-28 ENCOUNTER — HOSPITAL ENCOUNTER (OUTPATIENT)
Dept: GENERAL RADIOLOGY | Age: 63
Discharge: HOME | End: 2023-11-28
Attending: INTERNAL MEDICINE
Payer: COMMERCIAL

## 2023-11-28 ENCOUNTER — TELEPHONE (OUTPATIENT)
Dept: INTERNAL MEDICINE CLINIC | Facility: CLINIC | Age: 63
End: 2023-11-28

## 2023-11-28 VITALS
SYSTOLIC BLOOD PRESSURE: 118 MMHG | DIASTOLIC BLOOD PRESSURE: 68 MMHG | OXYGEN SATURATION: 92 % | TEMPERATURE: 98 F | RESPIRATION RATE: 18 BRPM | WEIGHT: 219.81 LBS | HEART RATE: 92 BPM | BODY MASS INDEX: 34.1 KG/M2 | HEIGHT: 67.5 IN

## 2023-11-28 DIAGNOSIS — R05.8 PRODUCTIVE COUGH: Primary | ICD-10-CM

## 2023-11-28 DIAGNOSIS — R05.3 COUGH, PERSISTENT: Primary | ICD-10-CM

## 2023-11-28 DIAGNOSIS — R05.8 PRODUCTIVE COUGH: ICD-10-CM

## 2023-11-28 PROCEDURE — 3074F SYST BP LT 130 MM HG: CPT | Performed by: INTERNAL MEDICINE

## 2023-11-28 PROCEDURE — 3008F BODY MASS INDEX DOCD: CPT | Performed by: INTERNAL MEDICINE

## 2023-11-28 PROCEDURE — 71046 X-RAY EXAM CHEST 2 VIEWS: CPT | Performed by: INTERNAL MEDICINE

## 2023-11-28 PROCEDURE — 3078F DIAST BP <80 MM HG: CPT | Performed by: INTERNAL MEDICINE

## 2023-11-28 PROCEDURE — 99213 OFFICE O/P EST LOW 20 MIN: CPT | Performed by: INTERNAL MEDICINE

## 2023-11-28 RX ORDER — METHYLPREDNISOLONE 4 MG/1
TABLET ORAL
Qty: 1 EACH | Refills: 0 | Status: SHIPPED | OUTPATIENT
Start: 2023-11-28

## 2023-11-28 RX ORDER — BENZONATATE 100 MG/1
100 CAPSULE ORAL 3 TIMES DAILY PRN
Qty: 30 CAPSULE | Refills: 1 | Status: SHIPPED | OUTPATIENT
Start: 2023-11-28

## 2023-11-28 RX ORDER — ALBUTEROL SULFATE 90 UG/1
2 AEROSOL, METERED RESPIRATORY (INHALATION) EVERY 4 HOURS PRN
Qty: 1 EACH | Refills: 1 | Status: SHIPPED | OUTPATIENT
Start: 2023-11-28

## 2023-11-29 NOTE — TELEPHONE ENCOUNTER
Patient notified there is no radon test that she knows of to order, chest xray is ok.  Pt states Radon Mitigation is coming to her house on Friday.  Pt states the radon level is supposed to be at 4 and their level was 16.  Pt notified to avoid going in the basement and avoid further exposure.  Advised to have a fresh air source upstairs also.  Pt verbalizes understanding. IRINEOI to TB.

## 2023-11-29 NOTE — TELEPHONE ENCOUNTER
TB - I'm not familiar with what we do for this. Can we order labs/tests or should she be directed to ER?

## 2023-11-29 NOTE — TELEPHONE ENCOUNTER
Pt stated she saw TB yesterday for this cough - started in September and has been a couple since then.  Pt stated they had their Radon testing and it's supposed at 4 and theirs was at 16.  Pt believes she has radon poisoning.  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 Pt stated her dog has been coughing.  High TE

## 2023-11-29 NOTE — TELEPHONE ENCOUNTER
There is no radon test that I know of to order for her. CXR was ok  She should call for setting up radon mitigation system in her house. Was her level high in the basement (that is usually the case)? If so, avoid going in basement.  I do not know any treatments for radon poisoning, main thing is to limit further exposure

## 2023-12-05 ENCOUNTER — HOSPITAL ENCOUNTER (OUTPATIENT)
Age: 63
Discharge: HOME | End: 2023-12-05
Attending: EMERGENCY MEDICINE
Payer: COMMERCIAL

## 2023-12-05 ENCOUNTER — APPOINTMENT (OUTPATIENT)
Dept: GENERAL RADIOLOGY | Age: 63
End: 2023-12-05
Attending: EMERGENCY MEDICINE
Payer: COMMERCIAL

## 2023-12-05 ENCOUNTER — TELEPHONE (OUTPATIENT)
Dept: INTERNAL MEDICINE CLINIC | Facility: CLINIC | Age: 63
End: 2023-12-05

## 2023-12-05 VITALS
OXYGEN SATURATION: 97 % | HEIGHT: 67.5 IN | RESPIRATION RATE: 18 BRPM | TEMPERATURE: 98 F | BODY MASS INDEX: 32.89 KG/M2 | DIASTOLIC BLOOD PRESSURE: 77 MMHG | WEIGHT: 212 LBS | SYSTOLIC BLOOD PRESSURE: 139 MMHG | HEART RATE: 77 BPM

## 2023-12-05 DIAGNOSIS — L50.9 URTICARIA: Primary | ICD-10-CM

## 2023-12-05 DIAGNOSIS — J40 BRONCHITIS: ICD-10-CM

## 2023-12-05 PROCEDURE — 71046 X-RAY EXAM CHEST 2 VIEWS: CPT | Performed by: EMERGENCY MEDICINE

## 2023-12-05 PROCEDURE — 99214 OFFICE O/P EST MOD 30 MIN: CPT

## 2023-12-05 PROCEDURE — 99213 OFFICE O/P EST LOW 20 MIN: CPT

## 2023-12-05 RX ORDER — BENZONATATE 100 MG/1
100 CAPSULE ORAL 3 TIMES DAILY PRN
Qty: 30 CAPSULE | Refills: 0 | Status: SHIPPED | OUTPATIENT
Start: 2023-12-05 | End: 2023-12-18

## 2023-12-05 NOTE — TELEPHONE ENCOUNTER
Pt stated she had SOB - she wants to be sure she's better, she's still coughing and yesterday she started with a rash that is all over her body, she's not sure if it's from the steroid she was given.   High TE    methylPREDNISolone (MEDROL) 4 MG Oral Tablet Therapy Pack 1 each 0 11/28/2023    Sig:   As directed.

## 2023-12-05 NOTE — TELEPHONE ENCOUNTER
LOV 11/28/23     Called and spoke w/ pt. Pt stated yesterday thought her skin was dry and itchy. This AM pt noticed red bumps that are itchy, stated rash to legs, abdomen and going up towards her face. Pt sounds SOB on the phone, asked pt about this. Pt stated her breathing is much better than it was before but still having SOB. Pt stated she also ran to  the phone and believes that is also why she is sounding SOB. Denies any new meds/products in last 24 hours. Pt using Albuterol and took Medrol this AM. Pt stated she does feel better but worried that it's still going. Pt coughing on the phone, cough sounds loud and productive. Notified I will update TB and see what she further recommends. Notified if before I call back, SOB worsens or rash goes to face and affects breathing to go to ER. Pt verbalizes understanding and agreeable to plan.     TB - Pt also had chest xray 11/28 which was normal. Pt still sounds bad on the phone, still coughing and SOB, but stated it's better. Do you think rash is from Medrol? Started taking a week ago. Further recs for pt? UC/ER?

## 2023-12-05 NOTE — TELEPHONE ENCOUNTER
Called and spoke w/ pt. Notified TB stated Medrol does not usually cause rash and TB advises pt go to  for further eval. Pt stated she can do that. Will go when her  is home from work or if she feels worse, will call him and go sooner. Pt verbalizes understanding of our recommendations and agreeable to plan.

## 2023-12-06 ENCOUNTER — TELEMEDICINE (OUTPATIENT)
Dept: INTERNAL MEDICINE CLINIC | Facility: CLINIC | Age: 63
End: 2023-12-06
Payer: COMMERCIAL

## 2023-12-06 DIAGNOSIS — R05.3 CHRONIC COUGH: Primary | ICD-10-CM
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
## 2023-12-06 PROCEDURE — 99213 OFFICE O/P EST LOW 20 MIN: CPT | Performed by: PHYSICIAN ASSISTANT

## 2023-12-06 RX ORDER — LEVALBUTEROL TARTRATE 45 UG/1
2 AEROSOL, METERED ORAL EVERY 4 HOURS PRN
Qty: 1 EACH | Refills: 1 | Status: SHIPPED | OUTPATIENT
Start: 2023-12-06

## 2023-12-06 RX ORDER — BUDESONIDE AND FORMOTEROL FUMARATE DIHYDRATE 80; 4.5 UG/1; UG/1
2 AEROSOL RESPIRATORY (INHALATION) 2 TIMES DAILY
Qty: 1 EACH | Refills: 1 | Status: SHIPPED | OUTPATIENT
Start: 2023-12-06

## 2023-12-06 NOTE — PROGRESS NOTES
Zaid Lawton is a 61year old female. HPI:    Pt presents with ongoing cough, fatigue, and wheezing x 3 months. She has been seen multiple times for this issue. Symptoms wax and wane but never resolve completely. She has been on 2 rounds of steroids with some temporary improvement. She has taken 4 abx - augmentin, doxycycline, cefuroxime, and augmentin again. No real improvement while on abx. She has intermittent SOB. She has also tried singulair and flonase with no relief. She uses albuterol prn but this makes her feel too jittery. She notes that she recently had the radon tested in her home and the levels were very high. Had a radon mitigation system installed and is waiting for the repeat testing. She has had 2 CXRs which were both normal.     Allergies: Allergies   Allergen Reactions    Shellfish NAUSEA AND VOMITING      Current Meds:  Current Outpatient Medications   Medication Sig Dispense Refill    Budesonide-Formoterol Fumarate (SYMBICORT) 80-4.5 MCG/ACT Inhalation Aerosol Inhale 2 puffs into the lungs 2 (two) times daily. Rinse mouth after use. 1 each 1    Levalbuterol Tartrate (XOPENEX HFA) 45 MCG/ACT Inhalation Aerosol Inhale 2 puffs into the lungs every 4 (four) hours as needed for Wheezing or Shortness of Breath. 1 each 1    benzonatate 100 MG Oral Cap Take 1 capsule (100 mg total) by mouth 3 (three) times daily as needed for cough. 30 capsule 0    albuterol (PROAIR HFA) 108 (90 Base) MCG/ACT Inhalation Aero Soln Inhale 2 puffs into the lungs every 4 (four) hours as needed for Wheezing or Shortness of Breath (cough). 1 each 1    benzonatate (TESSALON PERLES) 100 MG Oral Cap Take 1 capsule (100 mg total) by mouth 3 (three) times daily as needed for cough. (Patient not taking: Reported on 12/5/2023) 30 capsule 1    methylPREDNISolone (MEDROL) 4 MG Oral Tablet Therapy Pack As directed. 1 each 0    clindamycin 1 % External Lotion Apply 2 Applications topically 2 (two) times daily. cefuroxime 500 MG Oral Tab Take 1 tablet (500 mg total) by mouth 2 (two) times daily. (Patient not taking: Reported on 11/17/2023) 20 tablet 0    montelukast 10 MG Oral Tab Take 1 tablet (10 mg total) by mouth nightly. (Patient not taking: Reported on 12/5/2023) 90 tablet 3    triamcinolone 0.1 % External Ointment Apply 0.1 % topically as needed. Losartan Potassium-HCTZ 100-12.5 MG Oral Tab Take 1 tablet by mouth daily. 90 tablet 3    fluticasone propionate 50 MCG/ACT Nasal Suspension 1 spray by Each Nare route in the morning and 1 spray before bedtime. 3 each 3    Multiple Vitamins-Minerals (CENTRUM SILVER) Oral Tab Take 1 tablet by mouth daily. Vitamin D, Cholecalciferol, 50 MCG (2000 UT) Oral Cap Take 2,000 Units by mouth daily. PMH:     Past Medical History:   Diagnosis Date    Acute pharyngitis     Leg DVT (deep venous thromboembolism), acute, left ([facility] [address] 6/23/2022    Personal history of pneumonia (recurrent)     Pulmonary embolism, bilateral ([facility] [address] 6/23/2022    Unspecified essential hypertension          ROS:   Review of Systems   Constitutional:  Positive for fatigue. Negative for chills and fever. HENT:  Positive for congestion. Negative for sinus pressure, sinus pain and sore throat. Respiratory:  Positive for cough, shortness of breath and wheezing. Cardiovascular:  Negative for chest pain. Neurological:  Negative for dizziness, light-headedness and headaches. PHYSICAL EXAM:   No vital signs or physical exam completed for this visit as visit was done via telehealth. ASSESSMENT/ PLAN:   1. Chronic cough  - CT CHEST (CPT=71250); Future    2. Exposure to radon, initial encounter  - CT CHEST (CPT=71250);  Future    Trial of symbicort bid - reviewed possible side effects and instructions for use  Change albuterol to xopenex prn   Will also check CT chest due to persistent symptoms and radon exposure   To the ER with any persistent or worsening SOB  May need to see pulm pending CT results and response to above treatment plan   F/u in 1 week    Future Appointments   Date Time Provider Vibha Aurora   12/12/2023 11:20 AM Angel Viramontes.BONNY [facility]            Health Maintenance Due   Topic Date Due    COVID-19 Vaccine (1) Never done    Zoster Vaccines (1 of 2) Never done    Mammogram  01/25/2023    Influenza Vaccine (1) Never done         Pt indicates understanding and agrees to the plan. No follow-ups on file. Dionna Eric PA-C        Isidra Rebolledo understands phone evaluation is not a substitute for face-to-face examination or emergency care. Patient advised to go to ER or call 911 for worsening symptoms or acute distress. Please note that the following visit was completed using two-way, real-time interactive audio and video communication. This has been done in good paul to provide continuity of care in the best interest of the provider-patient relationship, due to the on-going public health crisis/national emergency and because of restrictions of visitation. There are limitations of this visit as no physical exam could be performed. Every conscious effort was taken to allow for sufficient and adequate time. This billing visit was spent on reviewing labs, medications, radiology tests and decision making. Appropriate medical decision-making and tests are ordered as detailed in the plan of care above.

## 2023-12-06 NOTE — DISCHARGE INSTRUCTIONS
Benzonatate for cough  Over the counter allergy med in the morning such as claritin, zyrtec, or xyzal  Benadryl 2 tabs at night

## 2023-12-06 NOTE — ED INITIAL ASSESSMENT (HPI)
Itchy rash to B legs since this morning    Pt just finished z pack for \"infection\"; still with cough/sore throat, wheezing is improving; fever resolved 8 days ago

## 2023-12-08 ENCOUNTER — TELEPHONE (OUTPATIENT)
Dept: INTERNAL MEDICINE CLINIC | Facility: CLINIC | Age: 63
End: 2023-12-08

## 2023-12-08 NOTE — TELEPHONE ENCOUNTER
Pt had VV with CS 12/6. PA request received for Levalbuterol Tartrate (XOPENEX HFA) 45 MCG/ACT.       On request, PA not required for:   albuterol sulfate HFA  Levalbuterol HCl  ProAir Dighaler  ProAir RespiClick  Ventolin HFA  Xopenex  Xopenex HFA.     CS, continue with PA on Levalbuterol Tartrate (XOPENEX HFA) 45 MCG/ACT or send alternative?

## 2023-12-10 ENCOUNTER — APPOINTMENT (OUTPATIENT)
Dept: CT IMAGING | Facility: HOSPITAL | Age: 63
End: 2023-12-10
Attending: EMERGENCY MEDICINE
Payer: COMMERCIAL

## 2023-12-10 ENCOUNTER — HOSPITAL ENCOUNTER (EMERGENCY)
Facility: HOSPITAL | Age: 63
Discharge: HOME | End: 2023-12-10
Attending: EMERGENCY MEDICINE
Payer: COMMERCIAL

## 2023-12-10 ENCOUNTER — APPOINTMENT (OUTPATIENT)
Dept: ULTRASOUND IMAGING | Facility: HOSPITAL | Age: 63
End: 2023-12-10
Attending: EMERGENCY MEDICINE
Payer: COMMERCIAL

## 2023-12-10 VITALS
OXYGEN SATURATION: 93 % | DIASTOLIC BLOOD PRESSURE: 76 MMHG | TEMPERATURE: 98 F | HEART RATE: 79 BPM | SYSTOLIC BLOOD PRESSURE: 128 MMHG | RESPIRATION RATE: 14 BRPM

## 2023-12-10 DIAGNOSIS — I82.432 ACUTE DEEP VEIN THROMBOSIS (DVT) OF POPLITEAL VEIN OF LEFT LOWER EXTREMITY (HCC): Primary | ICD-10-CM

## 2023-12-10 DIAGNOSIS — I26.99 ACUTE PULMONARY EMBOLISM WITHOUT ACUTE COR PULMONALE, UNSPECIFIED PULMONARY EMBOLISM TYPE (HCC): ICD-10-CM

## 2023-12-10 LAB
ALBUMIN SERPL-MCNC: 3.6 G/DL (ref 3.4–5)
ALBUMIN/GLOB SERPL: 1 {RATIO} (ref 1–2)
ALP LIVER SERPL-CCNC: 79 U/L
ALT SERPL-CCNC: 29 U/L
ANION GAP SERPL CALC-SCNC: 5 MMOL/L (ref 0–18)
APTT PPP: 27.1 SECONDS (ref 23.3–35.6)
AST SERPL-CCNC: 17 U/L (ref 15–37)
ATRIAL RATE: 86 BPM
BASOPHILS # BLD AUTO: 0.06 X10(3) UL (ref 0–0.2)
BASOPHILS NFR BLD AUTO: 0.9 %
BILIRUB SERPL-MCNC: 0.7 MG/DL (ref 0.1–2)
BUN BLD-MCNC: 11 MG/DL (ref 9–23)
CALCIUM BLD-MCNC: 9.5 MG/DL (ref 8.5–10.1)
CHLORIDE SERPL-SCNC: 106 MMOL/L (ref 98–112)
CO2 SERPL-SCNC: 28 MMOL/L (ref 21–32)
CREAT BLD-MCNC: 0.88 MG/DL
EGFRCR SERPLBLD CKD-EPI 2021: 74 ML/MIN/1.73M2 (ref 60–?)
EOSINOPHIL # BLD AUTO: 0.17 X10(3) UL (ref 0–0.7)
EOSINOPHIL NFR BLD AUTO: 2.5 %
ERYTHROCYTE [DISTWIDTH] IN BLOOD BY AUTOMATED COUNT: 12.7 %
GLOBULIN PLAS-MCNC: 3.5 G/DL (ref 2.8–4.4)
GLUCOSE BLD-MCNC: 113 MG/DL (ref 70–99)
HCT VFR BLD AUTO: 44.2 %
HGB BLD-MCNC: 15.3 G/DL
IMM GRANULOCYTES # BLD AUTO: 0.01 X10(3) UL (ref 0–1)
IMM GRANULOCYTES NFR BLD: 0.1 %
INR BLD: 1.07 (ref 0.8–1.2)
LYMPHOCYTES # BLD AUTO: 1.6 X10(3) UL (ref 1–4)
LYMPHOCYTES NFR BLD AUTO: 23.3 %
MCH RBC QN AUTO: 29.9 PG (ref 26–34)
MCHC RBC AUTO-ENTMCNC: 34.6 G/DL (ref 31–37)
MCV RBC AUTO: 86.5 FL
MONOCYTES # BLD AUTO: 0.63 X10(3) UL (ref 0.1–1)
MONOCYTES NFR BLD AUTO: 9.2 %
NEUTROPHILS # BLD AUTO: 4.4 X10 (3) UL (ref 1.5–7.7)
NEUTROPHILS # BLD AUTO: 4.4 X10(3) UL (ref 1.5–7.7)
NEUTROPHILS NFR BLD AUTO: 64 %
OSMOLALITY SERPL CALC.SUM OF ELEC: 288 MOSM/KG (ref 275–295)
P AXIS: 26 DEGREES
P-R INTERVAL: 188 MS
PLATELET # BLD AUTO: 219 10(3)UL (ref 150–450)
POTASSIUM SERPL-SCNC: 4 MMOL/L (ref 3.5–5.1)
PROT SERPL-MCNC: 7.1 G/DL (ref 6.4–8.2)
PROTHROMBIN TIME: 13.9 SECONDS (ref 11.6–14.8)
Q-T INTERVAL: 344 MS
QRS DURATION: 78 MS
QTC CALCULATION (BEZET): 411 MS
R AXIS: -25 DEGREES
RBC # BLD AUTO: 5.11 X10(6)UL
SODIUM SERPL-SCNC: 139 MMOL/L (ref 136–145)
T AXIS: 60 DEGREES
TROPONIN I SERPL HS-MCNC: 4 NG/L
VENTRICULAR RATE: 86 BPM
WBC # BLD AUTO: 6.9 X10(3) UL (ref 4–11)

## 2023-12-10 PROCEDURE — 93005 ELECTROCARDIOGRAM TRACING: CPT

## 2023-12-10 PROCEDURE — 80053 COMPREHEN METABOLIC PANEL: CPT | Performed by: EMERGENCY MEDICINE

## 2023-12-10 PROCEDURE — 99285 EMERGENCY DEPT VISIT HI MDM: CPT

## 2023-12-10 PROCEDURE — 84484 ASSAY OF TROPONIN QUANT: CPT | Performed by: EMERGENCY MEDICINE

## 2023-12-10 PROCEDURE — 85730 THROMBOPLASTIN TIME PARTIAL: CPT | Performed by: EMERGENCY MEDICINE

## 2023-12-10 PROCEDURE — 85025 COMPLETE CBC W/AUTO DIFF WBC: CPT | Performed by: EMERGENCY MEDICINE

## 2023-12-10 PROCEDURE — 93971 EXTREMITY STUDY: CPT | Performed by: EMERGENCY MEDICINE

## 2023-12-10 PROCEDURE — 93010 ELECTROCARDIOGRAM REPORT: CPT

## 2023-12-10 PROCEDURE — 36415 COLL VENOUS BLD VENIPUNCTURE: CPT

## 2023-12-10 PROCEDURE — 71275 CT ANGIOGRAPHY CHEST: CPT | Performed by: EMERGENCY MEDICINE

## 2023-12-10 PROCEDURE — 85610 PROTHROMBIN TIME: CPT | Performed by: EMERGENCY MEDICINE

## 2023-12-10 NOTE — CM/SW NOTE
Emergency Department Discharge Plan    Linda Guevara is a 61year old female who presented to the ED with DVT. ED Case Manager was asked to assist in arranging for home anticoagulation. Patient has been on Eliquis a couple of years ago. Printed prescription for Eliquis was given to patient. Patient's pharmacy is closed today. Patient will receive the first dose prior to leaving the ED. Patient was provided with a $10 co-pay card. Patient is not eligible for 30 day free card because she has been on Eliquis before. MD and RN updated.

## 2023-12-10 NOTE — ED INITIAL ASSESSMENT (HPI)
Pt presents to ED for left calf pain, no injury. Pt states hx of DVT and PE. Pt denies DENISE but states feels tight.

## 2023-12-12 ENCOUNTER — OFFICE VISIT (OUTPATIENT)
Dept: INTERNAL MEDICINE CLINIC | Facility: CLINIC | Age: 63
End: 2023-12-12
Payer: COMMERCIAL

## 2023-12-12 VITALS
BODY MASS INDEX: 33.76 KG/M2 | OXYGEN SATURATION: 96 % | DIASTOLIC BLOOD PRESSURE: 80 MMHG | SYSTOLIC BLOOD PRESSURE: 122 MMHG | HEART RATE: 102 BPM | WEIGHT: 217.63 LBS | HEIGHT: 67.5 IN | RESPIRATION RATE: 22 BRPM

## 2023-12-12 DIAGNOSIS — R05.3 CHRONIC COUGH: ICD-10-CM

## 2023-12-12 DIAGNOSIS — R30.0 DYSURIA: Primary | ICD-10-CM

## 2023-12-12 DIAGNOSIS — I26.94 MULTIPLE SUBSEGMENTAL PULMONARY EMBOLI WITHOUT ACUTE COR PULMONALE (HCC): ICD-10-CM

## 2023-12-12 DIAGNOSIS — I82.4Z2 ACUTE DEEP VEIN THROMBOSIS (DVT) OF DISTAL VEIN OF LEFT LOWER EXTREMITY (HCC): ICD-10-CM

## 2023-12-12 DIAGNOSIS — N30.00 ACUTE CYSTITIS WITHOUT HEMATURIA: ICD-10-CM

## 2023-12-12 LAB
APPEARANCE: CLEAR
BILIRUBIN: NEGATIVE
GLUCOSE (URINE DIPSTICK): NEGATIVE MG/DL
KETONES (URINE DIPSTICK): NEGATIVE MG/DL
MULTISTIX LOT#: ABNORMAL NUMERIC
NITRITE, URINE: NEGATIVE
PH, URINE: 6.5 (ref 4.5–8)
PROTEIN (URINE DIPSTICK): 30 MG/DL
SPECIFIC GRAVITY: 1.02 (ref 1–1.03)
UROBILINOGEN,SEMI-QN: 0.2 MG/DL (ref 0–1.9)

## 2023-12-12 PROCEDURE — 87088 URINE BACTERIA CULTURE: CPT | Performed by: PHYSICIAN ASSISTANT

## 2023-12-12 PROCEDURE — 87186 SC STD MICRODIL/AGAR DIL: CPT | Performed by: PHYSICIAN ASSISTANT

## 2023-12-12 PROCEDURE — 81003 URINALYSIS AUTO W/O SCOPE: CPT | Performed by: PHYSICIAN ASSISTANT

## 2023-12-12 PROCEDURE — 3008F BODY MASS INDEX DOCD: CPT | Performed by: PHYSICIAN ASSISTANT

## 2023-12-12 PROCEDURE — 3079F DIAST BP 80-89 MM HG: CPT | Performed by: PHYSICIAN ASSISTANT

## 2023-12-12 PROCEDURE — 3074F SYST BP LT 130 MM HG: CPT | Performed by: PHYSICIAN ASSISTANT

## 2023-12-12 PROCEDURE — 99214 OFFICE O/P EST MOD 30 MIN: CPT | Performed by: PHYSICIAN ASSISTANT

## 2023-12-12 PROCEDURE — 87086 URINE CULTURE/COLONY COUNT: CPT | Performed by: PHYSICIAN ASSISTANT

## 2023-12-12 RX ORDER — CEPHALEXIN 500 MG/1
500 CAPSULE ORAL 2 TIMES DAILY
Qty: 14 CAPSULE | Refills: 0 | Status: SHIPPED | OUTPATIENT
Start: 2023-12-12

## 2023-12-13 ENCOUNTER — TELEPHONE (OUTPATIENT)
Dept: INTERNAL MEDICINE CLINIC | Facility: CLINIC | Age: 63
End: 2023-12-13

## 2023-12-18 ENCOUNTER — TELEPHONE (OUTPATIENT)
Dept: INTERNAL MEDICINE CLINIC | Facility: CLINIC | Age: 63
End: 2023-12-18

## 2023-12-18 ENCOUNTER — OFFICE VISIT (OUTPATIENT)
Dept: INTERNAL MEDICINE CLINIC | Facility: CLINIC | Age: 63
End: 2023-12-18
Payer: COMMERCIAL

## 2023-12-18 ENCOUNTER — OFFICE VISIT (OUTPATIENT)
Facility: CLINIC | Age: 63
End: 2023-12-18
Payer: COMMERCIAL

## 2023-12-18 VITALS
BODY MASS INDEX: 34.06 KG/M2 | WEIGHT: 217 LBS | DIASTOLIC BLOOD PRESSURE: 72 MMHG | OXYGEN SATURATION: 96 % | SYSTOLIC BLOOD PRESSURE: 110 MMHG | RESPIRATION RATE: 16 BRPM | HEIGHT: 67 IN | HEART RATE: 87 BPM

## 2023-12-18 VITALS
SYSTOLIC BLOOD PRESSURE: 112 MMHG | HEART RATE: 87 BPM | WEIGHT: 217.63 LBS | BODY MASS INDEX: 34 KG/M2 | OXYGEN SATURATION: 97 % | DIASTOLIC BLOOD PRESSURE: 78 MMHG | TEMPERATURE: 97 F

## 2023-12-18 DIAGNOSIS — B37.0 THRUSH: Primary | ICD-10-CM
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
## 2023-12-18 DIAGNOSIS — I82.4Y9 ACUTE DEEP VEIN THROMBOSIS (DVT) OF PROXIMAL VEIN OF LOWER EXTREMITY, UNSPECIFIED LATERALITY (HCC): ICD-10-CM

## 2023-12-18 DIAGNOSIS — I26.99 OTHER ACUTE PULMONARY EMBOLISM, UNSPECIFIED WHETHER ACUTE COR PULMONALE PRESENT (HCC): ICD-10-CM

## 2023-12-18 DIAGNOSIS — J30.9 ALLERGIC RHINITIS, UNSPECIFIED SEASONALITY, UNSPECIFIED TRIGGER: ICD-10-CM

## 2023-12-18 DIAGNOSIS — I27.82 OTHER CHRONIC PULMONARY EMBOLISM WITHOUT ACUTE COR PULMONALE (HCC): Primary | ICD-10-CM

## 2023-12-18 DIAGNOSIS — J20.8 VIRAL BRONCHITIS: ICD-10-CM

## 2023-12-18 DIAGNOSIS — R05.2 SUBACUTE COUGH: ICD-10-CM

## 2023-12-18 PROCEDURE — 3074F SYST BP LT 130 MM HG: CPT | Performed by: NURSE PRACTITIONER

## 2023-12-18 PROCEDURE — 99204 OFFICE O/P NEW MOD 45 MIN: CPT | Performed by: INTERNAL MEDICINE

## 2023-12-18 PROCEDURE — 3078F DIAST BP <80 MM HG: CPT | Performed by: INTERNAL MEDICINE

## 2023-12-18 PROCEDURE — 3074F SYST BP LT 130 MM HG: CPT | Performed by: INTERNAL MEDICINE

## 2023-12-18 PROCEDURE — 3008F BODY MASS INDEX DOCD: CPT | Performed by: INTERNAL MEDICINE

## 2023-12-18 PROCEDURE — 99214 OFFICE O/P EST MOD 30 MIN: CPT | Performed by: NURSE PRACTITIONER

## 2023-12-18 PROCEDURE — 3078F DIAST BP <80 MM HG: CPT | Performed by: NURSE PRACTITIONER

## 2023-12-18 RX ORDER — FLUCONAZOLE 150 MG/1
150 TABLET ORAL ONCE
Qty: 2 TABLET | Refills: 0 | Status: SHIPPED | OUTPATIENT
Start: 2023-12-18 | End: 2023-12-18

## 2023-12-18 RX ORDER — CLOTRIMAZOLE 10 MG/1
10 LOZENGE ORAL; TOPICAL
Qty: 35 TROCHE | Refills: 1 | Status: SHIPPED | OUTPATIENT
Start: 2023-12-18

## 2023-12-18 NOTE — TELEPHONE ENCOUNTER
Called and spoke to pt. Per pt, she feels like she has thrush in back of throat. Offered to move appt to today with SD. Pt agreeable and appreciative.     JOSE LAWSON

## 2023-12-18 NOTE — TELEPHONE ENCOUNTER
Pt stated she thinks she has thrush.  Pt scheduled on below for this with PHILIPPE GARCIA.  Future Appointments   Date Time Provider Department Center   12/19/2023  7:40 AM Milagro Slaughter APRN [facility]

## 2023-12-19 ENCOUNTER — OFFICE VISIT (OUTPATIENT)
Dept: HEMATOLOGY/ONCOLOGY | Age: 63
End: 2023-12-19
Attending: INTERNAL MEDICINE
Payer: COMMERCIAL

## 2023-12-19 VITALS
DIASTOLIC BLOOD PRESSURE: 80 MMHG | OXYGEN SATURATION: 94 % | HEART RATE: 82 BPM | BODY MASS INDEX: 34 KG/M2 | WEIGHT: 217 LBS | TEMPERATURE: 97 F | SYSTOLIC BLOOD PRESSURE: 120 MMHG | RESPIRATION RATE: 18 BRPM

## 2023-12-19 DIAGNOSIS — I82.402 RECURRENT ACUTE DEEP VEIN THROMBOSIS (DVT) OF LEFT LOWER EXTREMITY (HCC): ICD-10-CM

## 2023-12-19 DIAGNOSIS — I26.99 RECURRENT PULMONARY EMBOLISM (HCC): ICD-10-CM

## 2023-12-19 DIAGNOSIS — I82.432 ACUTE DEEP VEIN THROMBOSIS (DVT) OF POPLITEAL VEIN OF LEFT LOWER EXTREMITY (HCC): Primary | ICD-10-CM

## 2023-12-19 DIAGNOSIS — I26.99 OTHER ACUTE PULMONARY EMBOLISM WITHOUT ACUTE COR PULMONALE (HCC): ICD-10-CM

## 2023-12-19 PROCEDURE — 99215 OFFICE O/P EST HI 40 MIN: CPT | Performed by: INTERNAL MEDICINE

## 2023-12-19 NOTE — PROGRESS NOTES
Patient here for f/u regarding 12/10/23 dx of right side PE and LLE DVT. Patient currently on 5 mg Eliquis bid. Patient states that since mid November 2023 she had fever and severe fatigue. Patient states that she still has sever fatigue. Patient states that she minimal left leg pain and intermittent right breast pain that radiates to the right side of chest. Patient has no further concerns or complaints at this time.      Education Record    Learner:  Patient    Disease / Diagnosis: DVT/PE    Barriers / Limitations:  None   Comments:    Method:  Discussion   Comments:    General Topics:  Medication, Pain, Side effects and symptom management, and Plan of care reviewed   Comments:    Outcome:  Shows understanding   Comments:

## 2023-12-22 ENCOUNTER — HOSPITAL ENCOUNTER (OUTPATIENT)
Dept: CV DIAGNOSTICS | Facility: HOSPITAL | Age: 63
Discharge: HOME | End: 2023-12-22
Attending: INTERNAL MEDICINE
Payer: COMMERCIAL

## 2023-12-22 DIAGNOSIS — I27.82 OTHER CHRONIC PULMONARY EMBOLISM WITHOUT ACUTE COR PULMONALE (HCC): ICD-10-CM

## 2023-12-22 PROCEDURE — 93306 TTE W/DOPPLER COMPLETE: CPT | Performed by: INTERNAL MEDICINE

## 2023-12-27 ENCOUNTER — RT VISIT (OUTPATIENT)
Dept: RESPIRATORY THERAPY | Facility: HOSPITAL | Age: 63
End: 2023-12-27
Attending: INTERNAL MEDICINE
Payer: COMMERCIAL

## 2023-12-27 DIAGNOSIS — I27.82 OTHER CHRONIC PULMONARY EMBOLISM WITHOUT ACUTE COR PULMONALE (HCC): ICD-10-CM

## 2023-12-27 PROBLEM — I26.99 PULMONARY EMBOLUS (HCC): Status: ACTIVE | Noted: 2023-12-19

## 2023-12-27 PROCEDURE — 94729 DIFFUSING CAPACITY: CPT | Performed by: INTERNAL MEDICINE

## 2023-12-27 PROCEDURE — 94726 PLETHYSMOGRAPHY LUNG VOLUMES: CPT | Performed by: INTERNAL MEDICINE

## 2023-12-27 PROCEDURE — 94010 BREATHING CAPACITY TEST: CPT | Performed by: INTERNAL MEDICINE

## 2023-12-28 NOTE — PROCEDURES
Pulmonary Function Test:   Findings:  Spirometry: FEV1 is 2.63 L, 99% predicted. FVC is 3.38 L, 99% predicted and FEV1/ FVC ratio is 0.78. The flow-volume loop demonstrates a normal pattern. MVV is 88, 83%predicted  Lung Volumes: The TLC is 6.73 L, 124% predicted. The residual volume 3.20 L, 153% predicted. Diffusion Capacity:  The diffusion capacity is 19.30 or 87% predicted and 99% predicted when corrected for alveolar volume. Impression:  There is no airway obstruction on spirometry and visualized on flow-volume loop. Lung volumes appear within normal limits. Despite the absence of airway obstruction, there is evidence of air trapping (residual volume of 3.20 L, 153% predicted) and there is evidence of hyperinflation (total lung capacity of 6.73 L, 124% predicted). Diffusion capacity appears normal range when considering alveolar volume. ation if not already performed. There are no previous pulmonary function tests available for comparison.        Alie Rodriguez MD

## 2024-01-02 ENCOUNTER — OFFICE VISIT (OUTPATIENT)
Facility: CLINIC | Age: 64
End: 2024-01-02
Payer: COMMERCIAL

## 2024-01-02 VITALS
RESPIRATION RATE: 16 BRPM | SYSTOLIC BLOOD PRESSURE: 128 MMHG | HEART RATE: 82 BPM | BODY MASS INDEX: 34.21 KG/M2 | OXYGEN SATURATION: 96 % | DIASTOLIC BLOOD PRESSURE: 76 MMHG | HEIGHT: 67 IN | WEIGHT: 218 LBS

## 2024-01-02 DIAGNOSIS — J30.9 ALLERGIC RHINITIS, UNSPECIFIED SEASONALITY, UNSPECIFIED TRIGGER: ICD-10-CM

## 2024-01-02 DIAGNOSIS — J20.8 VIRAL BRONCHITIS: Primary | ICD-10-CM

## 2024-01-02 PROCEDURE — 3074F SYST BP LT 130 MM HG: CPT | Performed by: INTERNAL MEDICINE

## 2024-01-02 PROCEDURE — 3078F DIAST BP <80 MM HG: CPT | Performed by: INTERNAL MEDICINE

## 2024-01-02 PROCEDURE — 99214 OFFICE O/P EST MOD 30 MIN: CPT | Performed by: INTERNAL MEDICINE

## 2024-01-02 PROCEDURE — 3008F BODY MASS INDEX DOCD: CPT | Performed by: INTERNAL MEDICINE

## 2024-01-02 NOTE — PROGRESS NOTES
[facility] Pulmonary Follow Up Note    History of Present Illness:  Shanae Wagner is a 63 year old female who presents today for follow up of cough and shortness of breath.  They were last seen on 23.    Since last visit, cough is overall better - still on symbicort with good relief of symptoms.  Has follow up with ENT for rhinosinusitis coming up.      Past Medical History:   Past Medical History:   Diagnosis Date    Acute pharyngitis     Allergic rhinitis     Anemia 2-84.  1-06    Normal now    History of blood clots 12-10-23    7-22    Leg DVT (deep venous thromboembolism), acute, left (HCC) 2022    Personal history of pneumonia (recurrent)     Pulmonary embolism, bilateral (HCC) 2022    Unspecified essential hypertension         Past Surgical History:   Past Surgical History:   Procedure Laterality Date    COLONOSCOPY N/A 2017    Procedure: COLONOSCOPY, POSSIBLE BIOPSY, POSSIBLE POLYPECTOMY 60375;  Surgeon: Kerwin Velasquez MD;  Location: [facility]    COLONOSCOPY,BIOPSY  2012    2 small cecal adenomatous polyps, Procedure: ESOPHAGOGASTRODUODENOSCOPY, COLONOSCOPY, POSSIBLE BIOPSY, POSSIBLE POLYPECTOMY 92867,87230;  Surgeon: Kerwin Velasquez MD;  Location: [facility]    COLONOSCOPY,DIAGNOSTIC  2017    normal    ENDOMETR ABLATE, THERMAL        6-15-87    I’m    UPPER GI ENDOSCOPY,DIAGNOSIS  2012    [facility], Procedure: ESOPHAGOGASTRODUODENOSCOPY, COLONOSCOPY, POSSIBLE BIOPSY, POSSIBLE POLYPECTOMY 01056,24528;  Surgeon: Kerwin Velasquez MD;  Location: [facility]       Family Medical History:   Family History   Problem Relation Age of Onset    Cancer Mother         breast    Breast Cancer Mother 65    DVT/VTE Mother 90        blood clot    Heart Disease Maternal Grandmother     DVT/VTE Maternal Grandmother 90        blood clot    Heart Disease Maternal Grandfather     Hypertension Father     Other (Other) Father         Social  History:   Social History     Socioeconomic History    Marital status:      Spouse name: Not on file    Number of children: Not on file    Years of education: Not on file    Highest education level: Not on file   Occupational History    Not on file   Tobacco Use    Smoking status: Never    Smokeless tobacco: Never   Vaping Use    Vaping Use: Never used   Substance and Sexual Activity    Alcohol use: Yes     Alcohol/week: 0.0 - 1.0 standard drinks of alcohol     Comment: cage 7/2/21, occasional, no hx of excessive use    Drug use: No    Sexual activity: Yes   Other Topics Concern     Service Not Asked    Blood Transfusions Not Asked    Caffeine Concern No    Occupational Exposure Not Asked    Hobby Hazards Not Asked    Sleep Concern Not Asked    Stress Concern Not Asked    Weight Concern Not Asked    Special Diet Not Asked    Back Care Not Asked    Exercise Yes     Comment: walking    Bike Helmet Not Asked    Seat Belt Not Asked    Self-Exams Not Asked   Social History Narrative    . 2 adult kids, 3 grandkids.  Previously worked as an , quit 5/2022     Social Determinants of Health     Financial Resource Strain: Not on file   Food Insecurity: Not on file   Transportation Needs: Not on file   Physical Activity: Not on file   Stress: Not on file   Social Connections: Not on file   Housing Stability: Not on file        Medications:   Current Outpatient Medications   Medication Sig Dispense Refill    apixaban 5 MG Oral Tab Take 1 tablet (5 mg total) by mouth 2 (two) times daily. 180 tablet 0    clotrimazole 10 MG Mouth/Throat Angely Take 1 lozenge (10 mg total) by mouth 5 (five) times daily. 35 Angely 1    clindamycin 1 % External Lotion Apply 2 Applications topically 2 (two) times daily.      Losartan Potassium-HCTZ 100-12.5 MG Oral Tab Take 1 tablet by mouth daily. 90 tablet 3       Review of Systems: Review of Systems     Physical Exam:  /76 (BP Location: Right arm, Patient  Position: Sitting, Cuff Size: adult)   Pulse 82   Resp 16   Ht 5' 7\" (1.702 m)   Wt 218 lb (98.9 kg)   LMP 2018 (Approximate)   SpO2 96%   BMI 34.14 kg/m²      Constitutional: alert, cooperative. No acute distress.  HEENT: Head NC/AT. Nasal turbinates appear red and inflammed  Cardio: Regular rate and rhythm. Normal S1 and S2. No murmurs.   Respiratory: Thorax symmetrical with no labored breathing. Clear to ausculation bilaterally with symmetrical breath sounds. No wheezing, rhonchi, rales, or crackles.   GI: NABS. Abd soft, non-tender.  Extremities: No clubbing or cyanosis. No BLE edema.    Neurologic: A&Ox3. No gross motor deficits.  Skin: Warm, dry  Psych: Calm, cooperative. Pleasant affect.    Results:  Personally reviewed  CARD ECHO 2D DOPPLER (CPT=93306)  Transthoracic Echocardiogram    Name:Shanae Wagner    Date: 2023 :  1960 Ht:  (67in)  BP: 120 / 60  MRN:  [id number]    Age:  63years    Wt:  (217lb) HR: 68bpm  Loc:  [facility]       Gndr: F          BSA: 2.09m^2  Sonographer: MATTHEW Bailey    Ordering:    Keisha Noe    Referring:   Keisha Noe    ----------------------------------------------------------------------------  History/Indications:  Chronic pulmonary embolisms without acute cor  pulmonale.  Risk factors:  Hypertension.    ----------------------------------------------------------------------------  Procedure information:  A transthoracic complete 2D study was performed.  Additional evaluation included M-mode, complete spectral Doppler, and color  Doppler.  Patient status:  Outpatient.  Location:  Echo laboratory.  Comparison was made to the study of 2022.    This was a routine study.  Transthoracic echocardiography for ventricular function evaluation and  assessment of valvular function. Image quality was adequate.    ECG rhythm:   Normal sinus  Study completion:  There were no  complications.    ----------------------------------------------------------------------------    Conclusions:    1. Left ventricle: The cavity size was normal. Wall thickness was normal.     Systolic function was normal. The estimated ejection fraction was 60-65%,     by 3D assessment. No diagnostic evidence for regional wall motion     abnormalities. Left ventricular diastolic function parameters were normal     for the patient's age.  2. Right ventricle: The cavity size was normal. Systolic function was     normal. The RV free wall longitudinal strain was -29.60%.  3. Right atrium: The atrium was normal in size.  4. Pulmonary arteries: Systolic pressure was within the normal range,     estimated to be 27mm Hg.  Impressions:  This study is compared with previous dated 6/24/2022: No  significant change since prior study.  *    ----------------------------------------------------------------------------  *  Findings:  Left ventricle:  The cavity size was normal. Wall thickness was normal.  Systolic function was normal. The estimated ejection fraction was 60-65%, by  3D assessment. No diagnostic evidence for regional wall motion  abnormalities. Left ventricular diastolic function parameters were normal  for the patient's age.  Ventricular septum:   Thickness was normal.  Left atrium:  The left atrial volume was normal.  Right ventricle:  The cavity size was normal. Systolic function was normal.  Right atrium:  The atrium was normal in size.  Mitral valve:  The valve was structurally normal. Leaflet separation was  normal.  Doppler:  Transvalvular velocity was within the normal range. There  was no evidence for stenosis. There was no significant regurgitation.  Aortic valve:   The valve was trileaflet. The leaflets were normal  thickness.  Doppler:  Transvalvular velocity was within the normal range.  There was no evidence for stenosis. There was no significant regurgitation.  Tricuspid valve:   Doppler:   Transvalvular velocity was within the normal  range. There was no evidence for stenosis. There was trivial regurgitation.  Pulmonic valve:   Not well visualized.  Doppler:  Transvalvular velocity was  within the normal range. There was no evidence for stenosis. There was no  significant regurgitation.  Pericardium:   There was no pericardial effusion.  Aorta:  Aortic root: The aortic root was normal.  Ascending aorta: The ascending aorta was normal.  Pulmonary arteries:  The main pulmonary artery was normal-sized. Systolic pressure was within the  normal range, estimated to be 27mm Hg.  Systemic veins:  Central venous respirophasic diameter changes are in the  normal range (>50%).  Inferior vena cava: The IVC was normal-sized.    ----------------------------------------------------------------------------  Measurements     Left ventricle        Value         Ref       06/24/2022   IVS thickness,        0.7    cm     0.6 - 0.9 0.8   ED, PLAX   LV ID, ED, PLAX       4.5    cm     3.8 - 5.2 5.0   LV ID, ES, PLAX       2.9    cm     2.2 - 3.5 2.9   LV PW thickness,  (H) 1.0    cm     0.6 - 0.9 0.8   ED, PLAX   IVS/LV PW ratio,      0.70          --------- 1.02   ED, PLAX   LV PW/LV ID           0.21          --------- ----------   ratio, ED, PLAX   LV ejection           65     %      54 - 74   72   fraction   LV e', lateral    (L) 8.0    cm/sec >=10.0    ----------   LV E/e', lateral      6             <=13      ----------   LV e', medial     (L) 5.1    cm/sec >=7.0     ----------   LV E/e', medial       9             --------- ----------   LV e', average        6.6    cm/sec --------- ----------   LV E/e', average      7             <=14      ----------     Aortic root           Value         Ref       06/24/2022   Aortic root ID,       3.3    cm     2.7 - 4.2 ----------   ED     Ascending aorta       Value         Ref       06/24/2022   Ascending aorta       3.5    cm     1.9 - 3.5 ----------   ID, A-P, ED     Left  atrium           Value         Ref       06/24/2022   LA ID, A-P, ES        3.5    cm     2.7 - 3.8 ----------   LA volume, S          42     ml     22 - 52   63   LA volume/bsa, S      20     ml/m^2 16 - 34   29   LA volume, ES,        32     ml     22 - 52   53   1-p A4C   LA volume, ES,        49     ml     22 - 52   70   1-p A2C   LA volume, ES,        44     ml     --------- ----------   A/L   LA volume/bsa,        21     ml/m^2 16 - 34   ----------   ES, A/L   LA/aortic root        1.06          --------- ----------   ratio     Mitral valve          Value         Ref       06/24/2022   Mitral E-wave         0.47   m/sec  --------- 0.58   peak velocity   Mitral A-wave         0.63   m/sec  --------- 0.8   peak velocity   Mitral E/A ratio,     0.7           --------- ----------   peak     Pulmonary artery      Value         Ref       06/24/2022   PA pressure, S,       27     mm Hg  --------- ----------   DP     Tricuspid valve       Value         Ref       06/24/2022   Tricuspid regurg      2.43   m/sec  <=2.8     ----------   peak velocity   Tricuspid peak        24     mm Hg  --------- ----------   RV-RA gradient     Systemic veins        Value         Ref       06/24/2022   Estimated CVP         3      mm Hg  --------- ----------     Right ventricle       Value         Ref       06/24/2022   RV free wall LS,      -29.60 %      --------- ----------   2D   TAPSE, 2D             2.48   cm     >=1.70    ----------   RV pressure, S,       27     mm Hg  --------- ----------   DP   RV s', lateral        10.8   cm/sec >=9.5     ----------  Legend:  (L)  and  (H)  reed values outside specified reference range.    ----------------------------------------------------------------------------    Prepared and electronically signed by  Zo Guido  12/22/2023 12:33      PFTs:       No data to display                   No data to display                    WBC: 6.9, done on 12/10/2023.  HGB: 15.3, done on  12/10/2023.  PLT: 219, done on 12/10/2023.     Glucose: 113, done on 12/10/2023.  Cr: 0.88, done on 12/10/2023.  GFR(AA): 80, done on 6/23/2022.  GFR (non-AA): 69, done on 6/23/2022.  CA: 9.5, done on 12/10/2023.  Na: 139, done on 12/10/2023.  K: 4, done on 12/10/2023.  Cl: 106, done on 12/10/2023.  CO2: 28, done on 12/10/2023.  Last ALB was 3.6% done on 12/10/2023.     CT ANGIOGRAPHY, CHEST (CPT=71275)    Result Date: 12/10/2023  CONCLUSION:   Findings of pulmonary embolism with small volume of intravascular thrombus identified within lobar/segmental pulmonary arterial branches supplying the right lower lobe and subsegmental arterial branch supplying the left lower lobe.  No secondary features  of right heart strain.  Of note, a similar distribution of pulmonary embolus identified on 06/23/2022 such that current CTA findings may represent acute on chronic PE.  Findings discussed with Dr. Villa at 9:45 a.m. Central standard time on 12/10/2023.    LOCATION:  [facility]   Dictated by (CST): Evan Juarez MD on 12/10/2023 at 9:38 AM     Finalized by (CST): Evan Juarez MD on 12/10/2023 at 9:46 AM       US VENOUS DOPPLER LEG LEFT - DIAG IMG (CPT=93971)    Result Date: 12/10/2023  CONCLUSION:   Findings of deep venous thrombosis with occlusive thrombus of the left distal popliteal vein and proximal/mid segments of the posterior tibial veins.  Findings discussed with  at 8:35 a.m. Central standard time on 12/10/2023.   LOCATION:  [facility]    Dictated by (CST): Evan Juarez MD on 12/10/2023 at 8:32 AM     Finalized by (CST): Evan Juarez MD on 12/10/2023 at 8:37 AM       XR CHEST PA + LAT CHEST (CPT=71046)    Result Date: 12/5/2023  CONCLUSION:  Negative exam.   LOCATION:  [facility]   Dictated by (CST): Corey Charles DO on 12/05/2023 at 7:11 PM     Finalized by (CST): Corey Charles DO on 12/05/2023 at 7:12 PM       XR CHEST PA + LAT CHEST (CPT=71046)    Result Date: 11/28/2023  CONCLUSION:  No consolidation.   LOCATION:   [id number]   Dictated by (CST): Lupillo Banda MD on 11/28/2023 at 3:33 PM     Finalized by (CST): Lupillo Banda MD on 11/28/2023 at 3:33 PM       US PELVIS W EV (CPT=76856/14682)    Result Date: 10/26/2023  CONCLUSION:  There are 2 uterine fibroids, the largest measuring 24 mm.  Neither ovary visualized.  No pelvic free fluid seen.   LOCATION:  [id number]   Dictated by (CST): Timbo Cordova MD on 10/26/2023 at 2:03 PM     Finalized by (CST): Timbo Cordova MD on 10/26/2023 at 2:05 PM         Assessment/Plan:  #1. Shortness of breath/cough  Overall improved  Would continue treatment for 3 total months  PFTs/echo with patient - elevated RV suggestive of asthma, but not conclusive  Will plan to see in 3 months and reevaluate at that time    #2. Rhinosinusitis  Has follow up with ENT      Return in about 3 months (around 4/2/2024).      Keisha Noe MD  1/2/2024

## 2024-01-11 ENCOUNTER — TELEPHONE (OUTPATIENT)
Facility: LOCATION | Age: 64
End: 2024-01-11

## 2024-01-11 ENCOUNTER — OFFICE VISIT (OUTPATIENT)
Facility: LOCATION | Age: 64
End: 2024-01-11
Payer: COMMERCIAL

## 2024-01-11 DIAGNOSIS — R09.82 POST-NASAL DRAINAGE: ICD-10-CM

## 2024-01-11 DIAGNOSIS — J32.4 CHRONIC PANSINUSITIS: Primary | ICD-10-CM

## 2024-01-11 PROCEDURE — 99204 OFFICE O/P NEW MOD 45 MIN: CPT | Performed by: OTOLARYNGOLOGY

## 2024-01-11 PROCEDURE — 77011 CT SCAN FOR LOCALIZATION: CPT | Performed by: OTOLARYNGOLOGY

## 2024-01-11 RX ORDER — IPRATROPIUM BROMIDE 42 UG/1
1 SPRAY, METERED NASAL 2 TIMES DAILY
Qty: 1 EACH | Refills: 5 | Status: SHIPPED | OUTPATIENT
Start: 2024-01-11 | End: 2024-01-11

## 2024-01-11 RX ORDER — IPRATROPIUM BROMIDE 42 UG/1
1 SPRAY, METERED NASAL 2 TIMES DAILY
Qty: 1 EACH | Refills: 5 | Status: SHIPPED | OUTPATIENT
Start: 2024-01-11

## 2024-01-11 NOTE — TELEPHONE ENCOUNTER
Pt called, ipratropium 0.06 % Nasal Solution not at [facility]. Pharmacy verified. Medication resent. mp  
NEGATIVE

## 2024-01-11 NOTE — PROGRESS NOTES
Shanae Wagner is a 63 year old female.   Chief Complaint   Patient presents with    Sinus Problem     HPI:   She has been sick with sinus.  She has had sinus and minor issues since September.  She usually gets sinus infections twice a year.  She has been on a steroid inhaler.  She has not really had headache but she has had fullness and pressure on her ears.  Current Outpatient Medications   Medication Sig Dispense Refill    ipratropium 0.06 % Nasal Solution 1 spray by Nasal route 2 (two) times daily. 1 each 5    apixaban 5 MG Oral Tab Take 1 tablet (5 mg total) by mouth 2 (two) times daily. 180 tablet 0    clotrimazole 10 MG Mouth/Throat Angely Take 1 lozenge (10 mg total) by mouth 5 (five) times daily. 35 Angely 1    clindamycin 1 % External Lotion Apply 2 Applications topically 2 (two) times daily.      Losartan Potassium-HCTZ 100-12.5 MG Oral Tab Take 1 tablet by mouth daily. 90 tablet 3      Past Medical History:   Diagnosis Date    Acute pharyngitis     Allergic rhinitis     Anemia 2-84.  1-06    Normal now    History of blood clots 12-10-23    7-22    Leg DVT (deep venous thromboembolism), acute, left (HCC) 06/23/2022    Personal history of pneumonia (recurrent)     Pulmonary embolism, bilateral (HCC) 06/23/2022    Unspecified essential hypertension       Social History:  Social History     Socioeconomic History    Marital status:    Tobacco Use    Smoking status: Never    Smokeless tobacco: Never   Vaping Use    Vaping Use: Never used   Substance and Sexual Activity    Alcohol use: Yes     Alcohol/week: 0.0 - 1.0 standard drinks of alcohol     Comment: cage 7/2/21, occasional, no hx of excessive use    Drug use: No    Sexual activity: Yes   Other Topics Concern    Caffeine Concern No    Exercise Yes     Comment: walking   Social History Narrative    . 2 adult kids, 3 grandkids.  Previously worked as an , quit 5/2022      Past Surgical History:   Procedure Laterality Date     COLONOSCOPY N/A 2017    Procedure: COLONOSCOPY, POSSIBLE BIOPSY, POSSIBLE POLYPECTOMY 69298;  Surgeon: Kerwin Velasquez MD;  Location: [facility]    COLONOSCOPY,BIOPSY  2012    2 small cecal adenomatous polyps, Procedure: ESOPHAGOGASTRODUODENOSCOPY, COLONOSCOPY, POSSIBLE BIOPSY, POSSIBLE POLYPECTOMY 47197,51873;  Surgeon: Kerwin Velasquez MD;  Location: [facility]    COLONOSCOPY,DIAGNOSTIC  2017    normal    ENDOMETR ABLATE, THERMAL        6-15-87    I’m    UPPER GI ENDOSCOPY,DIAGNOSIS  2012    [facility], Procedure: ESOPHAGOGASTRODUODENOSCOPY, COLONOSCOPY, POSSIBLE BIOPSY, POSSIBLE POLYPECTOMY 56987,34231;  Surgeon: Kerwin Velasquez MD;  Location: [facility]         REVIEW OF SYSTEMS:   GENERAL HEALTH: feels well otherwise  GENERAL : denies fever, chills, sweats, weight loss, weight gain  SKIN: denies any unusual skin lesions or rashes  RESPIRATORY: denies shortness of breath with exertion  NEURO: denies headaches    EXAM:   LMP 2018 (Approximate)     System Findings Details   Constitutional  Overall appearance - Normal.   Psychiatric  Orientation - Oriented to time, place, person & situation. Appropriate mood and affect.   Head/Face  Facial features -- Normal. Skull - Normal.   Eyes  Pupils equal ,round ,react to light and accomidate   Ears, Nose, Throat, Neck  Ears are clear without fluid nose erythema oropharynx erythema neck no masses   Neurological  Memory - Normal. Cranial nerves - Cranial nerves II through XII grossly intact.   Lymph Detail  Submental. Submandibular. Anterior cervical. Posterior cervical. Supraclavicular.     Clinical indication: Chronic sinusitis    Exam title: CT guidance stereotactic localization of sinuses    Technique: ASR on mini CAT was used with a sinus CT protocol.  The patient was positioned seated upright with the head aligned and supported with malar retention.  A  film was used to ensure  proper targeting.  Volume CT data was acquired.  Multiplanar reconstruction was performed on a viewing work stand to obtain axial and coronal images.  Images were manipulated to adjust contrast for bone window viewing.    Scan time: 20 seconds    Peak voltage: 120 K     Tube current: 48.30 MAS    Comparison to prior CT scans: None    Findings:  There is mild mucosal thickening in the maxillary and ethmoid sinuses.  The frontal and sphenoid sinuses are essentially clear.  Diagnosis:  Mild sinus disease  ASSESSMENT AND PLAN:   1. Chronic pansinusitis  Does show mild sinus disease on CT of the sinus.    2. Post-nasal drainage  I will treat her postnasal drip with nasal saline and Atrovent nasal spray.  She will then follow-up.      The patient indicates understanding of these issues and agrees to the plan.    No follow-ups on file.    Leonel Downey MD  1/11/2024  5:55 PM

## 2024-01-11 NOTE — TELEPHONE ENCOUNTER
PA started for CT in office. No Auth required. Transaction ID: [id number] ID: [id number] Date: 2024-01-11

## 2024-01-30 NOTE — PROGRESS NOTES
Shanae Wagner is a 63 year old female.    Chief Complaint   Patient presents with    Throat Problem     ES rm - 10 - Thresh for 2 mo.  No pain.       HPI:   Here for evaluation of a few issues.  URI recently with difficult recovery, PE/DVT now on apixaban   she follows with hematology and pulmonary.  On symbicort rinsing regularly after use.    Recurrent thrush last treated 12/23 with diflucan 1 dose and myclex dennise.   Completed and doing well as of now.  Will order mycelex to have on hand for exacerbation.      Increased stress  her daughter with GIST having Whipple with Dr Head.  She is tearful.     Acne  [facility] derm requesting to start spironolactone.  Her bp has been hard to control in the past and is currently borderline off amlodipine and on losartan hct.   Would not do losartan with spironolactone. Would need to change to amlodpine +/- dyazide.      Constipation.  Regularly with borderline constipation  ususaly managed with diet, water intake and smooth tea more recently, these measures are not helping.   Tried metamucil. Without help.  Discussed with patient.  Up to date w colonoscopy.  Will try benefiber and miralax.  Add colace only for a few days to help relieve current constipation.    Memory issues, word recall and brain fog.  Feels like her brain is \"Faroese cheese\"  no headaches.  Discussed impact of her recent illness and stress.    No loss of balance.  Discussed labs and monitoring   if persistent, she will need to call me.        Patient Active Problem List   Diagnosis    Degeneration of lumbar or lumbosacral intervertebral disc    Essential hypertension    Rosacea    Multiple thyroid nodules    Migraines    Tremor    History of pulmonary embolism    History of DVT in adulthood    Family history of blood clots    Acute deep vein thrombosis (DVT) of proximal vein of lower extremity (HCC)    Recurrent acute deep vein thrombosis (DVT) of left lower extremity (HCC)    Pulmonary embolus (HCC)     Current  Outpatient Medications   Medication Sig Dispense Refill    Azelaic Acid 15 % External Gel Apply 1 Application topically daily.      SYMBICORT 160-4.5 MCG/ACT Inhalation Aerosol Inhale 2 puffs into the lungs 2 (two) times daily. RINSE MOUTH AFTER USE      clotrimazole 10 MG Mouth/Throat Angely Take 1 lozenge (10 mg total) by mouth 5 (five) times daily. 35 Angely 0    ipratropium 0.06 % Nasal Solution 1 spray by Nasal route 2 (two) times daily. 1 each 5    apixaban 5 MG Oral Tab Take 1 tablet (5 mg total) by mouth 2 (two) times daily. 180 tablet 0    clindamycin 1 % External Lotion Apply 2 Applications topically 2 (two) times daily.      Losartan Potassium-HCTZ 100-12.5 MG Oral Tab Take 1 tablet by mouth daily. 90 tablet 3    spironolactone 50 MG Oral Tab Take 1 tablet (50 mg total) by mouth daily. (Patient not taking: Reported on 1/31/2024)        Past Medical History:   Diagnosis Date    Acute pharyngitis     Allergic rhinitis     Anemia 2-84.  1-06    Normal now    History of blood clots 12-10-23    7-22    Leg DVT (deep venous thromboembolism), acute, left (HCC) 06/23/2022    Personal history of pneumonia (recurrent)     Pulmonary embolism, bilateral (HCC) 06/23/2022    Unspecified essential hypertension       Social History:  Social History     Socioeconomic History    Marital status:    Tobacco Use    Smoking status: Never    Smokeless tobacco: Never   Vaping Use    Vaping Use: Never used   Substance and Sexual Activity    Alcohol use: Yes     Alcohol/week: 0.0 - 1.0 standard drinks of alcohol     Comment: cage 7/2/21, occasional, no hx of excessive use    Drug use: No    Sexual activity: Yes   Other Topics Concern    Caffeine Concern No    Exercise Yes     Comment: walking   Social History Narrative    . 2 adult kids, 3 grandkids.  Previously worked as an , quit 5/2022     Family History   Problem Relation Age of Onset    Cancer Mother         breast    Breast Cancer Mother 65    DVT/VTE  Mother 90        blood clot    Heart Disease Maternal Grandmother     DVT/VTE Maternal Grandmother 90        blood clot    Heart Disease Maternal Grandfather     Hypertension Father     Other (Other) Father         Allergies  Allergies   Allergen Reactions    Shellfish NAUSEA AND VOMITING       REVIEW OF SYSTEMS:   GENERAL HEALTH: as above   RESPIRATORY: denies shortness of breath with exertion, mild cough  CARDIOVASCULAR: denies chest pain on exertion, no palpatations  GI: denies abdominal pain and denies heartburn, no diarrhea or constipation  MUSCULOSKELETAL:  No arthralgias or myalgias  NEURO: as above denies headaches,     EXAM:   /82 (BP Location: Left arm, Patient Position: Sitting, Cuff Size: large)   Pulse 88   Temp 97.7 °F (36.5 °C) (Temporal)   Resp 16   Ht 5' 6.54\" (1.69 m)   Wt 220 lb (99.8 kg)   LMP 02/13/2018 (Approximate)   SpO2 96%   BMI 34.94 kg/m²   GENERAL: well developed, well nourished,in no apparent distress  SKIN: acne face.   LUNGS: normal rate without respiratory distress, lungs clear to auscultation  CARDIO: RRR   GI: normal bowel sounds, no masses, HSM or tenderness  EXTREMITIES: no edema, normal strength and tone  PSYCH: alert and oriented x 3    ASSESSMENT AND PLAN:     Encounter Diagnoses   Name Primary?    Fatigue, unspecified type  check labs including vit d and b12  she has not been taking supplements since she has been ill.   Yes    Memory change  monitor closely  may need to consider imaging.       Brain fog     Vitamin D deficiency check labs and restart daily supplment.       Encounter for screening mammogram for malignant neoplasm of breast     Acute deep vein thrombosis (DVT) of proximal vein of left lower extremity (HCC)  DO[facility]  Dr Paula.       Other chronic pulmonary embolism without acute cor pulmonale (HCC)     Recurrent acute deep vein thrombosis (DVT) of left lower extremity (HCC)     Essential hypertension  borderline but significant stress.  Would hold on  use of spironolactone at this time as would not favor use of ARB with spironolactone.  Consider amlodipine again instead of ARB +/- dyazide.  She will consider.      Recent URI  slowly recovering.    Will refill mycelex angely for her to have at home as she has experienced thrush with use of steroid inhaler.       Her daughter is having surgery with Dr Head.  I will check on her in a few weeks to make sure she is doing ok.  Increased stress  she is tearful today.      Orders Placed This Encounter   Procedures    Vitamin B12 [E]    Vitamin D [E]    TSH W Reflex To Free T4 [E]    Comp Metabolic Panel (14) [E]       Meds & Refills for this Visit:  Requested Prescriptions     Signed Prescriptions Disp Refills    clotrimazole 10 MG Mouth/Throat Angely 35 Angely 0     Sig: Take 1 lozenge (10 mg total) by mouth 5 (five) times daily.       Imaging & Consults:  [facility] JASMINA 2D+3D SCREENING BILAT (CPT=77067/06788)    No follow-ups on file.  There are no Patient Instructions on file for this visit.

## 2024-01-31 ENCOUNTER — LAB ENCOUNTER (OUTPATIENT)
Dept: LAB | Age: 64
End: 2024-01-31
Attending: NURSE PRACTITIONER
Payer: COMMERCIAL

## 2024-01-31 ENCOUNTER — OFFICE VISIT (OUTPATIENT)
Dept: INTERNAL MEDICINE CLINIC | Facility: CLINIC | Age: 64
End: 2024-01-31
Payer: COMMERCIAL

## 2024-01-31 VITALS
WEIGHT: 220 LBS | HEIGHT: 66.54 IN | TEMPERATURE: 98 F | RESPIRATION RATE: 16 BRPM | BODY MASS INDEX: 34.94 KG/M2 | OXYGEN SATURATION: 96 % | HEART RATE: 88 BPM | SYSTOLIC BLOOD PRESSURE: 146 MMHG | DIASTOLIC BLOOD PRESSURE: 82 MMHG

## 2024-01-31 DIAGNOSIS — I27.82 OTHER CHRONIC PULMONARY EMBOLISM WITHOUT ACUTE COR PULMONALE (HCC): ICD-10-CM

## 2024-01-31 DIAGNOSIS — R41.3 MEMORY CHANGE: ICD-10-CM

## 2024-01-31 DIAGNOSIS — E55.9 VITAMIN D DEFICIENCY: ICD-10-CM

## 2024-01-31 DIAGNOSIS — R41.89 BRAIN FOG: ICD-10-CM

## 2024-01-31 DIAGNOSIS — I10 ESSENTIAL HYPERTENSION: ICD-10-CM

## 2024-01-31 DIAGNOSIS — Z12.31 ENCOUNTER FOR SCREENING MAMMOGRAM FOR MALIGNANT NEOPLASM OF BREAST: ICD-10-CM

## 2024-01-31 DIAGNOSIS — J06.9 RECENT URI: ICD-10-CM

## 2024-01-31 DIAGNOSIS — I82.4Y2 ACUTE DEEP VEIN THROMBOSIS (DVT) OF PROXIMAL VEIN OF LEFT LOWER EXTREMITY (HCC): ICD-10-CM

## 2024-01-31 DIAGNOSIS — R53.83 FATIGUE, UNSPECIFIED TYPE: ICD-10-CM

## 2024-01-31 DIAGNOSIS — I82.402 RECURRENT ACUTE DEEP VEIN THROMBOSIS (DVT) OF LEFT LOWER EXTREMITY (HCC): ICD-10-CM

## 2024-01-31 DIAGNOSIS — R53.83 FATIGUE, UNSPECIFIED TYPE: Primary | ICD-10-CM

## 2024-01-31 PROBLEM — I82.432 ACUTE DEEP VEIN THROMBOSIS (DVT) OF POPLITEAL VEIN OF LEFT LOWER EXTREMITY (HCC): Status: RESOLVED | Noted: 2023-12-19 | Resolved: 2024-01-31

## 2024-01-31 PROBLEM — I26.99 ACUTE PULMONARY EMBOLISM (HCC): Status: RESOLVED | Noted: 2022-06-23 | Resolved: 2024-01-31

## 2024-01-31 LAB
ALBUMIN SERPL-MCNC: 4 G/DL (ref 3.4–5)
ALBUMIN/GLOB SERPL: 1.1 {RATIO} (ref 1–2)
ALP LIVER SERPL-CCNC: 78 U/L
ALT SERPL-CCNC: 34 U/L
ANION GAP SERPL CALC-SCNC: 7 MMOL/L (ref 0–18)
AST SERPL-CCNC: 24 U/L (ref 15–37)
BILIRUB SERPL-MCNC: 0.7 MG/DL (ref 0.1–2)
BUN BLD-MCNC: 16 MG/DL (ref 9–23)
CALCIUM BLD-MCNC: 10 MG/DL (ref 8.5–10.1)
CHLORIDE SERPL-SCNC: 109 MMOL/L (ref 98–112)
CO2 SERPL-SCNC: 27 MMOL/L (ref 21–32)
CREAT BLD-MCNC: 1.04 MG/DL
EGFRCR SERPLBLD CKD-EPI 2021: 60 ML/MIN/1.73M2 (ref 60–?)
FASTING STATUS PATIENT QL REPORTED: NO
GLOBULIN PLAS-MCNC: 3.7 G/DL (ref 2.8–4.4)
GLUCOSE BLD-MCNC: 116 MG/DL (ref 70–99)
OSMOLALITY SERPL CALC.SUM OF ELEC: 298 MOSM/KG (ref 275–295)
POTASSIUM SERPL-SCNC: 4.2 MMOL/L (ref 3.5–5.1)
PROT SERPL-MCNC: 7.7 G/DL (ref 6.4–8.2)
SODIUM SERPL-SCNC: 143 MMOL/L (ref 136–145)
TSI SER-ACNC: 1.46 MIU/ML (ref 0.36–3.74)
VIT B12 SERPL-MCNC: 530 PG/ML (ref 193–986)
VIT D+METAB SERPL-MCNC: 33.3 NG/ML (ref 30–100)

## 2024-01-31 PROCEDURE — 84443 ASSAY THYROID STIM HORMONE: CPT

## 2024-01-31 PROCEDURE — 3077F SYST BP >= 140 MM HG: CPT | Performed by: NURSE PRACTITIONER

## 2024-01-31 PROCEDURE — 80053 COMPREHEN METABOLIC PANEL: CPT

## 2024-01-31 PROCEDURE — 3079F DIAST BP 80-89 MM HG: CPT | Performed by: NURSE PRACTITIONER

## 2024-01-31 PROCEDURE — 82607 VITAMIN B-12: CPT

## 2024-01-31 PROCEDURE — 82306 VITAMIN D 25 HYDROXY: CPT

## 2024-01-31 PROCEDURE — 99214 OFFICE O/P EST MOD 30 MIN: CPT | Performed by: NURSE PRACTITIONER

## 2024-01-31 PROCEDURE — 3008F BODY MASS INDEX DOCD: CPT | Performed by: NURSE PRACTITIONER

## 2024-01-31 RX ORDER — SPIRONOLACTONE 50 MG/1
50 TABLET, FILM COATED ORAL DAILY
COMMUNITY
Start: 2024-01-24

## 2024-01-31 RX ORDER — AZELAIC ACID 0.15 G/G
1 GEL TOPICAL DAILY
COMMUNITY
Start: 2024-01-24

## 2024-01-31 RX ORDER — CLOTRIMAZOLE 10 MG/1
10 LOZENGE ORAL; TOPICAL
Qty: 35 TROCHE | Refills: 0 | Status: SHIPPED | OUTPATIENT
Start: 2024-01-31

## 2024-01-31 RX ORDER — BUDESONIDE AND FORMOTEROL FUMARATE DIHYDRATE 160; 4.5 UG/1; UG/1
2 AEROSOL RESPIRATORY (INHALATION) 2 TIMES DAILY
COMMUNITY
Start: 2024-01-11

## 2024-02-14 ENCOUNTER — OFFICE VISIT (OUTPATIENT)
Facility: LOCATION | Age: 64
End: 2024-02-14
Payer: COMMERCIAL

## 2024-02-14 DIAGNOSIS — J32.4 CHRONIC PANSINUSITIS: Primary | ICD-10-CM

## 2024-02-14 PROCEDURE — 99213 OFFICE O/P EST LOW 20 MIN: CPT | Performed by: OTOLARYNGOLOGY

## 2024-02-14 NOTE — PROGRESS NOTES
Shanae Wagner is a 63 year old female.   Chief Complaint   Patient presents with    Sinus Problem     HPI:   She has had persistent sinus complaints including congestion postnasal drip and some headache    REVIEW OF SYSTEMS:   GENERAL HEALTH: feels well otherwise  GENERAL : denies fever, chills, sweats, weight loss, weight gain  SKIN: denies any unusual skin lesions or rashes  RESPIRATORY: denies shortness of breath with exertion  NEURO: denies headaches    EXAM:   LMP 02/13/2018 (Approximate)     System Findings Details   Constitutional  Overall appearance - Normal.   Psychiatric  Orientation - Oriented to time, place, person & situation. Appropriate mood and affect.   Head/Face  Facial features -- Normal. Skull - Normal.   Eyes  Pupils equal ,round ,react to light and accomidate   Ears, Nose, Throat, Neck  Ears clear nose some erythema oropharynx cobblestoning neck no masses   Neurological  Memory - Normal. Cranial nerves - Cranial nerves II through XII grossly intact.   Lymph Detail  Submental. Submandibular. Anterior cervical. Posterior cervical. Supraclavicular.       ASSESSMENT AND PLAN:   1. Chronic pansinusitis  She does have some changes of chronic sinusitis.  She is had persistent symptoms despite allergy medicines including montelukast along with saline and also Atrovent nasal spray.  I would like her to follow-up with an allergist for allergy testing.  She will see me back after the above.      The patient indicates understanding of these issues and agrees to the plan.    No follow-ups on file.    Leonel Downey MD  2/14/2024  12:14 PM

## 2024-02-21 RX ORDER — BUDESONIDE AND FORMOTEROL FUMARATE DIHYDRATE 160; 4.5 UG/1; UG/1
2 AEROSOL RESPIRATORY (INHALATION) 2 TIMES DAILY
Qty: 10.2 G | Refills: 0 | Status: SHIPPED | OUTPATIENT
Start: 2024-02-21

## 2024-02-21 NOTE — TELEPHONE ENCOUNTER
Last VISIT:01/31/2024 SD,APRN    Last CPE:05/02/2023 SD,APRN    Last REFILL:01/11/2024   Medication Quantity Refills Start End   SYMBICORT 160-4.5 MCG/ACT Inhalation Aerosol -- --         Last LABS:01/31/2024    Future Appointments   Date Time Provider Department Center   3/5/2024  3:30 PM PF US RM3 PF US [facility]   3/5/2024  4:30 PM PF CT RM1 PF CT [facility]   3/11/2024 10:15 AM Nhan Norwood MD  [facility]M ONC [facility]   3/22/2024 10:00 AM Nhan Norwood MD  HEM ONC [facility]   3/26/2024  3:00 PM Jessica Valente MD [facility]   4/11/2024  9:00 AM Keisha Noe MD [facility] Pulm [facility] [place]         Per PROTOCOL? Non Protocol    Please Approve or Deny.

## 2024-03-05 ENCOUNTER — HOSPITAL ENCOUNTER (OUTPATIENT)
Dept: CT IMAGING | Age: 64
Discharge: HOME | End: 2024-03-05
Attending: INTERNAL MEDICINE
Payer: COMMERCIAL

## 2024-03-05 ENCOUNTER — HOSPITAL ENCOUNTER (OUTPATIENT)
Dept: ULTRASOUND IMAGING | Age: 64
Discharge: HOME | End: 2024-03-05
Attending: INTERNAL MEDICINE
Payer: COMMERCIAL

## 2024-03-05 DIAGNOSIS — I26.99 OTHER ACUTE PULMONARY EMBOLISM WITHOUT ACUTE COR PULMONALE (HCC): ICD-10-CM

## 2024-03-05 DIAGNOSIS — I26.99 RECURRENT PULMONARY EMBOLISM (HCC): ICD-10-CM

## 2024-03-05 DIAGNOSIS — I82.402 RECURRENT ACUTE DEEP VEIN THROMBOSIS (DVT) OF LEFT LOWER EXTREMITY (HCC): ICD-10-CM

## 2024-03-05 DIAGNOSIS — I82.432 ACUTE DEEP VEIN THROMBOSIS (DVT) OF POPLITEAL VEIN OF LEFT LOWER EXTREMITY (HCC): ICD-10-CM

## 2024-03-05 LAB
CREAT BLD-MCNC: 0.9 MG/DL
EGFRCR SERPLBLD CKD-EPI 2021: 72 ML/MIN/1.73M2 (ref 60–?)

## 2024-03-05 PROCEDURE — 71275 CT ANGIOGRAPHY CHEST: CPT | Performed by: INTERNAL MEDICINE

## 2024-03-05 PROCEDURE — 93971 EXTREMITY STUDY: CPT | Performed by: INTERNAL MEDICINE

## 2024-03-05 PROCEDURE — 82565 ASSAY OF CREATININE: CPT

## 2024-03-11 ENCOUNTER — OFFICE VISIT (OUTPATIENT)
Dept: HEMATOLOGY/ONCOLOGY | Facility: HOSPITAL | Age: 64
End: 2024-03-11
Attending: INTERNAL MEDICINE
Payer: COMMERCIAL

## 2024-03-11 VITALS
WEIGHT: 224.19 LBS | OXYGEN SATURATION: 95 % | SYSTOLIC BLOOD PRESSURE: 137 MMHG | DIASTOLIC BLOOD PRESSURE: 83 MMHG | RESPIRATION RATE: 16 BRPM | TEMPERATURE: 98 F | HEART RATE: 81 BPM | BODY MASS INDEX: 36 KG/M2

## 2024-03-11 DIAGNOSIS — Z86.711 HISTORY OF PULMONARY EMBOLISM: Primary | ICD-10-CM

## 2024-03-11 DIAGNOSIS — Z86.718 HISTORY OF DVT IN ADULTHOOD: ICD-10-CM

## 2024-03-11 DIAGNOSIS — I26.99 RECURRENT PULMONARY EMBOLISM (HCC): ICD-10-CM

## 2024-03-11 DIAGNOSIS — I82.402 RECURRENT ACUTE DEEP VEIN THROMBOSIS (DVT) OF LEFT LOWER EXTREMITY (HCC): ICD-10-CM

## 2024-03-11 DIAGNOSIS — I82.432 ACUTE DEEP VEIN THROMBOSIS (DVT) OF POPLITEAL VEIN OF LEFT LOWER EXTREMITY (HCC): ICD-10-CM

## 2024-03-11 DIAGNOSIS — I26.99 OTHER ACUTE PULMONARY EMBOLISM WITHOUT ACUTE COR PULMONALE (HCC): ICD-10-CM

## 2024-03-11 LAB
ALBUMIN SERPL-MCNC: 3.8 G/DL (ref 3.4–5)
ALBUMIN/GLOB SERPL: 1.1 {RATIO} (ref 1–2)
ALP LIVER SERPL-CCNC: 78 U/L
ALT SERPL-CCNC: 34 U/L
ANION GAP SERPL CALC-SCNC: 3 MMOL/L (ref 0–18)
AST SERPL-CCNC: 25 U/L (ref 15–37)
BASOPHILS # BLD AUTO: 0.07 X10(3) UL (ref 0–0.2)
BASOPHILS NFR BLD AUTO: 1.3 %
BILIRUB SERPL-MCNC: 0.6 MG/DL (ref 0.1–2)
BUN BLD-MCNC: 16 MG/DL (ref 9–23)
CALCIUM BLD-MCNC: 9.7 MG/DL (ref 8.5–10.1)
CHLORIDE SERPL-SCNC: 110 MMOL/L (ref 98–112)
CO2 SERPL-SCNC: 28 MMOL/L (ref 21–32)
CREAT BLD-MCNC: 0.94 MG/DL
D DIMER PPP FEU-MCNC: 0.66 UG/ML FEU (ref ?–0.63)
EGFRCR SERPLBLD CKD-EPI 2021: 68 ML/MIN/1.73M2 (ref 60–?)
EOSINOPHIL # BLD AUTO: 0.12 X10(3) UL (ref 0–0.7)
EOSINOPHIL NFR BLD AUTO: 2.1 %
ERYTHROCYTE [DISTWIDTH] IN BLOOD BY AUTOMATED COUNT: 12.1 %
FASTING STATUS PATIENT QL REPORTED: NO
GLOBULIN PLAS-MCNC: 3.4 G/DL (ref 2.8–4.4)
GLUCOSE BLD-MCNC: 124 MG/DL (ref 70–99)
HCT VFR BLD AUTO: 44.3 %
HGB BLD-MCNC: 15.3 G/DL
IMM GRANULOCYTES # BLD AUTO: 0.01 X10(3) UL (ref 0–1)
IMM GRANULOCYTES NFR BLD: 0.2 %
LYMPHOCYTES # BLD AUTO: 1.73 X10(3) UL (ref 1–4)
LYMPHOCYTES NFR BLD AUTO: 30.9 %
MCH RBC QN AUTO: 29.7 PG (ref 26–34)
MCHC RBC AUTO-ENTMCNC: 34.5 G/DL (ref 31–37)
MCV RBC AUTO: 85.9 FL
MONOCYTES # BLD AUTO: 0.49 X10(3) UL (ref 0.1–1)
MONOCYTES NFR BLD AUTO: 8.8 %
NEUTROPHILS # BLD AUTO: 3.18 X10 (3) UL (ref 1.5–7.7)
NEUTROPHILS # BLD AUTO: 3.18 X10(3) UL (ref 1.5–7.7)
NEUTROPHILS NFR BLD AUTO: 56.7 %
OSMOLALITY SERPL CALC.SUM OF ELEC: 295 MOSM/KG (ref 275–295)
PLATELET # BLD AUTO: 227 10(3)UL (ref 150–450)
POTASSIUM SERPL-SCNC: 4.2 MMOL/L (ref 3.5–5.1)
PROT SERPL-MCNC: 7.2 G/DL (ref 6.4–8.2)
RBC # BLD AUTO: 5.16 X10(6)UL
SODIUM SERPL-SCNC: 141 MMOL/L (ref 136–145)
WBC # BLD AUTO: 5.6 X10(3) UL (ref 4–11)

## 2024-03-11 PROCEDURE — 99214 OFFICE O/P EST MOD 30 MIN: CPT | Performed by: INTERNAL MEDICINE

## 2024-03-11 NOTE — PROGRESS NOTES
Hematology Clinic Follow Up Visit    Patient Name: Shanae Wagner  Medical Record Number: [id number]    YOB: 1960    PCP: Vernell Sheldon MD     Reason for Consultation:  Shanae Wagner was seen today for the diagnosis of pulmonary embolism    Hematologic History:  *recurrent PE + LLE DVT  -5/24/22- + for COVID-19  -6/23/22- CTA chest- positive for acute pulmonary emboli within distal lobar, segmental, and subsegmental branches in the right middle lobe, right lower lobe, lingula, and left lower lobe.  There is a tiny acute pulmonary embolism within segmental branch of the right upper lobe.  Overall clot burden is moderate. Small pulmonary infarcts are seen within the lateral inferior aspect of the right middle lobe and right lower lobe. Fatty infiltration of the liver.     -6/24/22- LLE venous doppler- DVT in the left popliteal vein extending to left posterior tibial vein.   -started UFH gtt, discharged on eliquis  -9/20/22- LLE venous doppler- No acute deep vein thrombosis.  Resolution of previously identified popliteal vein thrombus.    -stopped eliquis  -12/20/23- developed recurrent PE + LLE DVT pt reports preceding 2-3 mth period of URI sx but tested negative for COVID-19.   -12/10/23- LLE venous doppler- Findings of deep venous thrombosis with occlusive thrombus of the left distal popliteal vein and proximal/mid segments of the posterior tibial veins.   -12/10/23- CTA chest- Findings of pulmonary embolism with intravascular thrombus identified within lobar/segmental pulmonary arterial branches supplying the right lower lobe and subsegmental arterial branch supplying the left lower lobe    -restarted eliquis  -3/5/24- LLL venous doppler-  Compared to the previous exam there has been interval improvement.  There is still some mild chronic appearing nonocclusive thrombus within the peripheral left popliteal vein.  Previously the thrombus in the popliteal vein was occlusive.  There has been complete  recanalization of the left posterior tibial veins in the proximal and mid calf. There is no acute DVT identified.   -3/5/24- CTA chest- No acute pulmonary embolus.  No acute pulmonary findings.   -3/11/24- switched to maint dosed eliquis 2.5mg BID to be continued indefinitely    ========================================  Interval events: Presents for follow-up.  She has been taking Eliquis 5mg BID since last visit.  She denies any bleeding issues or excessive bruising.  No increased dyspnea or chest pain.  She reports that she is starting to have a sore throat and a runny nose with minimal cough.  No fevers.  Repeat imaging performed last week as above    Past Medical History:  Past Medical History:   Diagnosis Date    Acute pharyngitis     Allergic rhinitis     Anemia 2-84.  1-06    Normal now    History of blood clots 12-10-23    722    Leg DVT (deep venous thromboembolism), acute, left (HCC) 2022    Personal history of pneumonia (recurrent)     Pulmonary embolism, bilateral (HCC) 2022    Unspecified essential hypertension      Past Surgical History:   Procedure Laterality Date    COLONOSCOPY N/A 2017    Procedure: COLONOSCOPY, POSSIBLE BIOPSY, POSSIBLE POLYPECTOMY 02552;  Surgeon: Kerwin Velasquez MD;  Location: [facility]    COLONOSCOPY,BIOPSY  2012    2 small cecal adenomatous polyps, Procedure: ESOPHAGOGASTRODUODENOSCOPY, COLONOSCOPY, POSSIBLE BIOPSY, POSSIBLE POLYPECTOMY 39680,57350;  Surgeon: Kerwin Velasquez MD;  Location: [facility]    COLONOSCOPY,DIAGNOSTIC  2017    normal    ENDOMETR ABLATE, THERMAL        6-15-87    I’m    UPPER GI ENDOSCOPY,DIAGNOSIS  2012    wnl, Procedure: ESOPHAGOGASTRODUODENOSCOPY, COLONOSCOPY, POSSIBLE BIOPSY, POSSIBLE POLYPECTOMY 99259,03421;  Surgeon: Kerwin Velasquez MD;  Location: [facility]       Home Medications:   Azelaic Acid 15 % External Gel Apply 1 Application topically daily.       ipratropium 0.06 % Nasal Solution 1 spray by Nasal route 2 (two) times daily. 1 each 5    apixaban 5 MG Oral Tab Take 1 tablet (5 mg total) by mouth 2 (two) times daily. 180 tablet 0    clindamycin 1 % External Lotion Apply 2 Applications topically 2 (two) times daily.      Losartan Potassium-HCTZ 100-12.5 MG Oral Tab Take 1 tablet by mouth daily. 90 tablet 3     Allergies:   Allergies   Allergen Reactions    Shellfish NAUSEA AND VOMITING       Psychosocial History:  Social History     Social History Narrative    . 2 adult kids, 3 grandkids.  Previously worked as an , quit 5/2022     Social History     Socioeconomic History    Marital status:    Tobacco Use    Smoking status: Never    Smokeless tobacco: Never   Vaping Use    Vaping Use: Never used   Substance and Sexual Activity    Alcohol use: Yes     Alcohol/week: 0.0 - 1.0 standard drinks of alcohol     Comment: cage 7/2/21, occasional, no hx of excessive use    Drug use: No    Sexual activity: Yes   Other Topics Concern    Caffeine Concern No    Exercise Yes     Comment: walking   Social History Narrative    . 2 adult kids, 3 grandkids.  Previously worked as an , quit 5/2022     Family Medical History:  Family History   Problem Relation Age of Onset    Cancer Mother         breast    Breast Cancer Mother 65    DVT/VTE Mother 90        blood clot    Heart Disease Maternal Grandmother     DVT/VTE Maternal Grandmother 90        blood clot    Heart Disease Maternal Grandfather     Hypertension Father     Other (Other) Father      Review of Systems:  A 10-point ROS was done with pertinent positives and negative per the HPI    Vital Signs:  Height: --  Weight: 101.7 kg (224 lb 3.2 oz) (03/11 1019)  BSA (Calculated - sq m): --  Pulse: 81 (03/11 1019)  BP: 137/83 (03/11 1019)  Temp: 97.5 °F (36.4 °C) (03/11 1019)  Do Not Use - Resp Rate: --  SpO2: 95 % (03/11 1019)    Wt Readings from Last 6 Encounters:   03/11/24 101.7 kg  (224 lb 3.2 oz)   01/31/24 99.8 kg (220 lb)   01/02/24 98.9 kg (218 lb)   12/19/23 98.4 kg (217 lb)   12/18/23 98.7 kg (217 lb 9.6 oz)   12/18/23 98.4 kg (217 lb)     Physical Examination:  General: Patient is alert and oriented  Psych: Mood and affect are appropriate  Eyes: EOMI  ENT: Oropharynx is clear  CV: Regular rate and rhythm, no murmurs  Respiratory: Lungs clear to auscultation bilaterally  GI/Abd: Soft, non-tender with normoactive bowel sounds, no hepatosplenomegaly  Neurological: Grossly intact   Lymphatics: No palpable lymphadenopathy  Skin: no rashes or petechiae  Ext: no LE edema    Laboratory:  Lab Results   Component Value Date    WBC 5.6 03/11/2024    WBC 6.9 12/10/2023    WBC 6.4 04/24/2023    HGB 15.3 03/11/2024    HGB 15.3 12/10/2023    HGB 15.0 04/24/2023    HCT 44.3 03/11/2024    MCV 85.9 03/11/2024    MCH 29.7 03/11/2024    MCHC 34.5 03/11/2024    RDW 12.1 03/11/2024    .0 03/11/2024    .0 12/10/2023    .0 04/24/2023     Lab Results   Component Value Date     (H) 01/31/2024    BUN 16 01/31/2024    BUNCREA 15.1 06/24/2021    CREATSERUM 1.04 (H) 01/31/2024    CREATSERUM 0.88 12/10/2023    CREATSERUM 1.04 (H) 04/24/2023    ANIONGAP 7 01/31/2024    GFR 81 02/20/2017    GFRNAA 69 06/23/2022    GFRAA 80 06/23/2022    CA 10.0 01/31/2024    OSMOCALC 298 (H) 01/31/2024    ALKPHO 78 01/31/2024    AST 24 01/31/2024    ALT 34 01/31/2024    BILT 0.7 01/31/2024    TP 7.7 01/31/2024    ALB 4.0 01/31/2024    GLOBULIN 3.7 01/31/2024    AGRATIO 1.5 02/15/2016     01/31/2024    K 4.2 01/31/2024     01/31/2024    CO2 27.0 01/31/2024     Lab Results   Component Value Date    PTT 27.1 12/10/2023    PT 13.6 12/30/2011    INR 1.07 12/10/2023     Lab Results   Component Value Date    DDIMER 0.66 (H) 03/11/2024     Component      Latest Ref Rng & Units 6/23/2022   D-Dimer DDU      <400 ng/mL DDU 1,370 (H)     Imaging:    As reviewed above    Impression & Plan:     *History of  recurrent acute pulmonary embolism and LLE popliteal/tibial DVT  -Has had 2 separate acute VTE episodes.  First episode involved PE and LLE popliteal/tibial DVT 6/23/22 that was provoked by COVID-19 infection.  She completed 3 months of Eliquis, repeat LLE venous Doppler on 9/20/22 documented complete resolution of prior DVT.  No repeat chest imaging was performed at that point.  Developed recurrent acute LLE popliteal/tibial DVT with acute PE that was less clearly provoked 12/2023.   -Based on her clinical history I favor she remain on anticoagulation indefinitely given high risk for recurrent VTE.  We discussed that her risk for recurrent VTE off anticoagulation is significantly higher than her risk for bleeding complications on anticoagulation.  She has now completed 3 months of therapeutic anticoagulation with Eliquis and therefore will now transition to maintenance dosing Eliquis at 2.5mg BID to be continued indefinitely.  -Check CBC and CMP q6 months while remains on anticoagulation    RTC in 6 months    Nhan Norwood MD  Hematology/Medical Oncology  [facility]

## 2024-03-11 NOTE — PROGRESS NOTES
Education Record     Learner:  Patient     Disease / Diagnosis: DVT/PE     Barriers / Limitations:  None                Comments:     Method:  Discussion                Comments:     General Topics:  Plan of care reviewed                Comments:     Outcome:  Shows understanding                Comments: 3 month f/up. Taking eliquis as directed without bleeding complications. Looking to discuss imaging done on 3/5.

## 2024-03-22 ENCOUNTER — APPOINTMENT (OUTPATIENT)
Dept: HEMATOLOGY/ONCOLOGY | Age: 64
End: 2024-03-22
Attending: INTERNAL MEDICINE
Payer: COMMERCIAL

## 2024-03-26 ENCOUNTER — OFFICE VISIT (OUTPATIENT)
Dept: OBGYN CLINIC | Facility: CLINIC | Age: 64
End: 2024-03-26

## 2024-03-26 VITALS
WEIGHT: 224 LBS | DIASTOLIC BLOOD PRESSURE: 88 MMHG | SYSTOLIC BLOOD PRESSURE: 158 MMHG | HEIGHT: 66.54 IN | BODY MASS INDEX: 35.57 KG/M2

## 2024-03-26 DIAGNOSIS — K59.04 CHRONIC IDIOPATHIC CONSTIPATION: ICD-10-CM

## 2024-03-26 DIAGNOSIS — R10.2 PELVIC PRESSURE IN FEMALE: Primary | ICD-10-CM

## 2024-03-26 DIAGNOSIS — D25.1 INTRAMURAL UTERINE FIBROID: ICD-10-CM

## 2024-03-26 PROCEDURE — 3077F SYST BP >= 140 MM HG: CPT | Performed by: OBSTETRICS & GYNECOLOGY

## 2024-03-26 PROCEDURE — 99203 OFFICE O/P NEW LOW 30 MIN: CPT | Performed by: OBSTETRICS & GYNECOLOGY

## 2024-03-26 PROCEDURE — 3008F BODY MASS INDEX DOCD: CPT | Performed by: OBSTETRICS & GYNECOLOGY

## 2024-03-26 PROCEDURE — 3079F DIAST BP 80-89 MM HG: CPT | Performed by: OBSTETRICS & GYNECOLOGY

## 2024-03-26 NOTE — PROGRESS NOTES
[facility]  Obstetrics and Gynecology  Gynecology Visit    Chief Complaint   Patient presents with    Consult           Shanae Wagner is a 63 year old female who presents for consult fibroids.    LMP: postmenopausal.    Menses regular: n/a.    Menstrual flow normal: n/a.    Birth control or HRT:  0.   Refill 0  Last Pap Smear: .  Any history of abnormal paps: No hx abn   Last MMG: up to date  Any Medication Refills needed today?: 0  Sleep: 7-8 hours.    Diet: balanced.    Exercise: occasional.   Screening labs/Blood work today: no.     Colonoscopy (if over 44 y/o): up to date.   Gardasil:(age 9-44 y/o) no.   Genetic Cancer screen (if indicated): no.   Flu (Aug-April): up to date.TDAP (every 10 years) up to date.      Additional Problems/concerns: Patient would like to discuss fibroids and options. Patient concerned because she feel constant pressure.        Next Appt: n/a    Immunization History   Administered Date(s) Administered    TD 2006    TDAP 2017         Current Outpatient Medications:     apixaban 2.5 MG Oral Tab, Take 1 tablet (2.5 mg total) by mouth 2 (two) times daily., Disp: 180 tablet, Rfl: 2    Azelaic Acid 15 % External Gel, Apply 1 Application topically daily., Disp: , Rfl:     ipratropium 0.06 % Nasal Solution, 1 spray by Nasal route 2 (two) times daily., Disp: 1 each, Rfl: 5    clindamycin 1 % External Lotion, Apply 2 Applications topically 2 (two) times daily., Disp: , Rfl:     Losartan Potassium-HCTZ 100-12.5 MG Oral Tab, Take 1 tablet by mouth daily., Disp: 90 tablet, Rfl: 3    Allergies   Allergen Reactions    Shellfish NAUSEA AND VOMITING       OB History    Para Term  AB Living   3 2 2 0 0 2   SAB IAB Ectopic Multiple Live Births   0 0 0 0 2      # Outcome Date GA Lbr Patric/2nd Weight Sex Delivery Anes PTL Lv   3       NORMAL SPONT   FD   2 Term      NORMAL SPONT   IVORY   1 Term      NORMAL SPONT   IVORY       Past Medical History:   Diagnosis Date     Acute pharyngitis     Allergic rhinitis     Anemia 2-84.  1-06    Normal now    History of blood clots 12-10-23    7-22    Leg DVT (deep venous thromboembolism), acute, left (HCC) 2022    Personal history of pneumonia (recurrent)     Pulmonary embolism, bilateral (HCC) 2022    Unspecified essential hypertension        Past Surgical History:   Procedure Laterality Date    COLONOSCOPY N/A 2017    Procedure: COLONOSCOPY, POSSIBLE BIOPSY, POSSIBLE POLYPECTOMY 92211;  Surgeon: Kerwin Velasquez MD;  Location: [facility]    COLONOSCOPY,BIOPSY  2012    2 small cecal adenomatous polyps, Procedure: ESOPHAGOGASTRODUODENOSCOPY, COLONOSCOPY, POSSIBLE BIOPSY, POSSIBLE POLYPECTOMY 36517,04078;  Surgeon: Kerwin Velasquez MD;  Location: [facility]    COLONOSCOPY,DIAGNOSTIC  2017    normal    ENDOMETR ABLATE, THERMAL  2005      6[birth year]    I’m    UPPER GI ENDOSCOPY,DIAGNOSIS  2012    [facility], Procedure: ESOPHAGOGASTRODUODENOSCOPY, COLONOSCOPY, POSSIBLE BIOPSY, POSSIBLE POLYPECTOMY 34837,77285;  Surgeon: Kerwin Velasquez MD;  Location: [facility]       Family History   Problem Relation Age of Onset    Cancer Mother         breast    Breast Cancer Mother 65    DVT/VTE Mother 90        blood clot    Heart Disease Maternal Grandmother     DVT/VTE Maternal Grandmother 90        blood clot    Heart Disease Maternal Grandfather     Hypertension Father     Other (Other) Father                Social History     Socioeconomic History    Marital status:      Spouse name: Not on file    Number of children: Not on file    Years of education: Not on file    Highest education level: Not on file   Occupational History    Not on file   Tobacco Use    Smoking status: Never    Smokeless tobacco: Never   Vaping Use    Vaping Use: Never used   Substance and Sexual Activity    Alcohol use: Yes     Alcohol/week: 0.0 - 1.0 standard drinks of alcohol     Comment:  cage 7/2/21, occasional, no hx of excessive use    Drug use: No    Sexual activity: Yes   Other Topics Concern     Service Not Asked    Blood Transfusions Not Asked    Caffeine Concern No    Occupational Exposure Not Asked    Hobby Hazards Not Asked    Sleep Concern Not Asked    Stress Concern Not Asked    Weight Concern Not Asked    Special Diet Not Asked    Back Care Not Asked    Exercise Yes     Comment: walking    Bike Helmet Not Asked    Seat Belt Not Asked    Self-Exams Not Asked   Social History Narrative    . 2 adult kids, 3 grandkids.  Previously worked as an , quit 5/2022     Social Determinants of Health     Financial Resource Strain: Not on file   Food Insecurity: Not on file   Transportation Needs: Not on file   Physical Activity: Not on file   Stress: Not on file   Social Connections: Not on file   Housing Stability: Not on file       /88   Ht 5' 6.54\" (1.69 m)   Wt 223 lb 15.8 oz (101.6 kg)   LMP 02/13/2018 (Approximate)   Wt Readings from Last 3 Encounters:   03/26/24 223 lb 15.8 oz (101.6 kg)   03/11/24 224 lb 3.2 oz (101.7 kg)   01/31/24 220 lb (99.8 kg)     Health Maintenance   Topic Date Due    Zoster Vaccines (1 of 2) Never done    Mammogram  01/25/2023    COVID-19 Vaccine (1 - 2023-24 season) Never done    Influenza Vaccine (1) Never done    HTN: BP Follow-Up  02/29/2024    Annual Physical  05/02/2024    Pap Smear  03/02/2025    DTaP,Tdap,and Td Vaccines (2 - Td or Tdap) 02/28/2027    Colorectal Cancer Screening  07/11/2027    Annual Depression Screening  Completed    Pneumococcal Vaccine: Birth to 64yrs  Aged Out         Review of Systems   General: Present- Feeling well.  Cardiovascular: Not Present- Chest Pain, Elevated Blood Pressure, Leg Pain and/or Swelling and Shortness of Breath.  Gastrointestinal: Not Present- Nausea and Vomiting.  Female Genitourinary: Not Present- Discharge, Dysmenorrhea, Dysuria, Excessive Menstrual Bleeding and Pelvic  Pain.  Musculoskeletal: Not Present- Leg Cramps and Swelling of Extremities.  Neurological: Not Present- Headaches.  Psychiatric: Not Present- Anxiety and Depression.  All other systems negative       Physical Exam   The physical exam findings are as follows:       General   Mental Status - Alert. General Appearance - Well Developed/Well Nourished/No acute distress/ NC/AT.      Note: More than 50% of this visit was spent in counseling or coordinating care for the following reason Pelvic pressure.  Patient declines any bowel or bladder incontinence reviewed ultrasound with patient her largest fibroid is 24 mm she has been menopausal and has had an ablation in the past.  Patient notes that she has chronic constipation she is only having a bowel movement once or twice a week if that.  Patient encouraged to follow-up with her primary care provider for possible prescription for Linzess and/or referral to GI for better evaluation.  Highly unlikely that a 1.2 or 2.4 mm fibroid is causing her increased pelvic pain.      FINDINGS:                UTERUS:  9.72 cm x 4.10 cm x 5.42 cm    Endometrium Thickness: 0.37 cm    24 x 22 x 18 mm uterine fibroid at the junction between fundus and body, a smaller fibroid 12 x 11 x 9 mm in the anterior uterine body.    Neither right nor left ovaries identified.  This may be secondary to bowel gas.    CUL-DE-SAC:  Normal.  No fluid or mass.    OTHER:  Negative.                        Impression   CONCLUSION:  There are 2 uterine fibroids, the largest measuring 24 mm.  Neither ovary visualized.  No pelvic free fluid seen.       1. Pelvic pressure in female    2. Chronic idiopathic constipation    3. Intramural uterine fibroid

## 2024-04-11 ENCOUNTER — OFFICE VISIT (OUTPATIENT)
Facility: CLINIC | Age: 64
End: 2024-04-11
Payer: COMMERCIAL

## 2024-04-11 VITALS
WEIGHT: 223 LBS | RESPIRATION RATE: 14 BRPM | BODY MASS INDEX: 35.42 KG/M2 | HEIGHT: 66.5 IN | OXYGEN SATURATION: 97 % | HEART RATE: 72 BPM | DIASTOLIC BLOOD PRESSURE: 66 MMHG | SYSTOLIC BLOOD PRESSURE: 110 MMHG

## 2024-04-11 DIAGNOSIS — J30.9 ALLERGIC RHINITIS, UNSPECIFIED SEASONALITY, UNSPECIFIED TRIGGER: Primary | ICD-10-CM

## 2024-04-11 DIAGNOSIS — I27.82 OTHER CHRONIC PULMONARY EMBOLISM WITHOUT ACUTE COR PULMONALE (HCC): ICD-10-CM

## 2024-04-11 PROCEDURE — 3074F SYST BP LT 130 MM HG: CPT | Performed by: INTERNAL MEDICINE

## 2024-04-11 PROCEDURE — 3008F BODY MASS INDEX DOCD: CPT | Performed by: INTERNAL MEDICINE

## 2024-04-11 PROCEDURE — 99214 OFFICE O/P EST MOD 30 MIN: CPT | Performed by: INTERNAL MEDICINE

## 2024-04-11 PROCEDURE — 3078F DIAST BP <80 MM HG: CPT | Performed by: INTERNAL MEDICINE

## 2024-04-11 NOTE — PROGRESS NOTES
[facility] Pulmonary Follow Up Note    Chief Complaint:  Chief Complaint   Patient presents with    Follow - Up     3 month f/u       History of Present Illness:  Shanae Wagner is a 63 year old female who presents today for follow up of shortness of breath and cough.    Interval history:  Since last visit, patient was previously on Symbicort for some postviral bronchitis, this was controlled with the Symbicort and she was on it for 3 months and has now been off for a month.  She reports that her breathing is in good control and essentially back to normal.  She is following with hematology for pulmonary embolism on Eliquis, she had follow-up CT scan that did not show any evidence of pulmonary embolism.      Past Medical History:   Past Medical History:    Acute pharyngitis    Allergic rhinitis    Anemia    Normal now    History of blood clots    7-22    Leg DVT (deep venous thromboembolism), acute, left (HCC)    Personal history of pneumonia (recurrent)    Pulmonary embolism, bilateral (HCC)    Unspecified essential hypertension        Past Surgical History:   Past Surgical History:   Procedure Laterality Date    Colonoscopy N/A 2017    Procedure: COLONOSCOPY, POSSIBLE BIOPSY, POSSIBLE POLYPECTOMY 37511;  Surgeon: Kerwin Velasquez MD;  Location: [facility]    Colonoscopy,biopsy  2012    2 small cecal adenomatous polyps, Procedure: ESOPHAGOGASTRODUODENOSCOPY, COLONOSCOPY, POSSIBLE BIOPSY, POSSIBLE POLYPECTOMY 61546,61550;  Surgeon: Kerwin Velasquez MD;  Location: [facility]    Colonoscopy,diagnostic  2017    normal    Endometr ablate, thermal        6-15-87    I’m    Upper gi endoscopy,diagnosis  2012    [facility], Procedure: ESOPHAGOGASTRODUODENOSCOPY, COLONOSCOPY, POSSIBLE BIOPSY, POSSIBLE POLYPECTOMY 97384,92300;  Surgeon: Kerwin Velasquez MD;  Location: [facility]       Family Medical History:   Family History   Problem Relation Age of Onset     Cancer Mother         breast    Breast Cancer Mother 65    DVT/VTE Mother 90        blood clot    Heart Disease Maternal Grandmother     DVT/VTE Maternal Grandmother 90        blood clot    Heart Disease Maternal Grandfather     Hypertension Father     Other (Other) Father         Social History:   Social History     Socioeconomic History    Marital status:      Spouse name: Not on file    Number of children: Not on file    Years of education: Not on file    Highest education level: Not on file   Occupational History    Not on file   Tobacco Use    Smoking status: Never    Smokeless tobacco: Never   Vaping Use    Vaping status: Never Used   Substance and Sexual Activity    Alcohol use: Yes     Alcohol/week: 0.0 - 1.0 standard drinks of alcohol     Comment: cage 7/2/21, occasional, no hx of excessive use    Drug use: No    Sexual activity: Yes   Other Topics Concern     Service Not Asked    Blood Transfusions Not Asked    Caffeine Concern No    Occupational Exposure Not Asked    Hobby Hazards Not Asked    Sleep Concern Not Asked    Stress Concern Not Asked    Weight Concern Not Asked    Special Diet Not Asked    Back Care Not Asked    Exercise Yes     Comment: walking    Bike Helmet Not Asked    Seat Belt Not Asked    Self-Exams Not Asked   Social History Narrative    . 2 adult kids, 3 grandkids.  Previously worked as an , quit 5/2022     Social Determinants of Health     Financial Resource Strain: Not on file   Food Insecurity: Not on file   Transportation Needs: Not on file   Physical Activity: Not on file   Stress: Not on file   Social Connections: Not on file   Housing Stability: Not on file        Medications:   Current Outpatient Medications   Medication Sig Dispense Refill    apixaban 2.5 MG Oral Tab Take 1 tablet (2.5 mg total) by mouth 2 (two) times daily. 180 tablet 2    ipratropium 0.06 % Nasal Solution 1 spray by Nasal route 2 (two) times daily. 1 each 5    clindamycin 1 %  External Lotion Apply 2 Applications topically 2 (two) times daily.      Losartan Potassium-HCTZ 100-12.5 MG Oral Tab Take 1 tablet by mouth daily. 90 tablet 3       Review of Systems: Review of Systems     Physical Exam:  /66 (BP Location: Right arm, Patient Position: Sitting, Cuff Size: adult)   Pulse 72   Resp 14   Ht 5' 6.5\" (1.689 m)   Wt 223 lb (101.2 kg)   LMP 02/13/2018 (Approximate)   SpO2 97%   BMI 35.45 kg/m²      Constitutional: alert, cooperative. No acute distress.  HEENT: Head NC/AT. Nares normal. Septum midline. Mucosa normal. No drainage or sinus tenderness.  Cardio: Regular rate and rhythm. Normal S1 and S2. No murmurs.   Respiratory: Thorax symmetrical with no labored breathing. clear to auscultation bilaterally  Extremities: No clubbing or cyanosis. No BLE edema.    Neurologic: A&Ox3. No gross motor deficits.  Skin: Warm, dry  Psych: Calm, cooperative. Pleasant affect.    Results:  Personally reviewed  CT ANGIOGRAPHY, CHEST (CPT=71275)  Narrative: PROCEDURE:  CT ANGIOGRAPHY, CHEST (CPT=71275)     COMPARISON:  [facility] , CT, CT ANGIOGRAPHY, CHEST (CPT=71275), 12/10/2023, 9:16 AM.  ANI , CT, CT ANGIOGRAPHY, CHEST (CPT=71275), 6/23/2022, 5:23 PM.     INDICATIONS:  I26.99 Recurrent pulmonary embolism (HCC) I26.99 Other acute pulmonary embolism without acute cor pulmonale (HCC)     TECHNIQUE:  IV contrast-enhanced multislice CT angiography is performed through the pulmonary arterial anatomy. 3D volume renderings are generated.  Dose reduction techniques were used. Dose information is transmitted to the ACR (American College of   Radiology) NRDR (National Radiology Data Registry) which includes the Dose Index Registry.     PATIENT STATED HISTORY:(As transcribed by Technologist)  Patient has recurring pulmonary embolisms and dyspnea. S/P 3 months of anticoagulation.      CONTRAST USED:  100cc of Isovue 370     FINDINGS:    VASCULATURE:  Normal 3 vessel arch.  No evidence of aortic aneurysm  or dissection.  No acute pulmonary embolus is noted.  LUNGS:  Dependent atelectasis bilaterally.  No focal consolidation.  No pneumothorax.  MEDIASTINUM/DOMINIQUE:  No mediastinal or hilar adenopathy.  PLEURA:  No pleural effusions.  CARDIAC:  No pericardial effusion.  CHEST WALL:  Normal.  LIMITED ABDOMEN:  Normal  BONES:  Degenerative changes of the thoracic spine.                   Impression: CONCLUSION:    1. No acute pulmonary embolus.  2. No acute pulmonary findings.             LOCATION:  [facility]        Dictated by (CST): Malathi Leach MD on 3/05/2024 at 6:15 PM       Finalized by (CST): Malathi Leach MD on 3/05/2024 at 6:18 PM     US VENOUS DOPPLER LEG LEFT - DIAG IMG (CPT=93971)  Narrative: PROCEDURE:  US VENOUS DOPPLER LEG LEFT - DIAG IMG (CPT=93971)     COMPARISON:  US [facility]I, US VENOUS DOPPLER LEG LEFT - DIAG IMG (CPT=93971), 12/10/2023, 7:52 AM.     INDICATIONS:  I82.402 Recurrent acute deep vein thrombosis (DVT) of left lower extremity (HCC) I82.432 Acute deep vein thrombosis (DVT) of popliteal vein of left lower extre*     TECHNIQUE:  Real time, grey scale, and duplex ultrasound was used to evaluate the lower extremity venous system. B-mode two-dimensional images of the vascular structures, Doppler spectral analysis, and color flow.  Doppler imaging were performed.  The   following veins were imaged:  Common, deep, and superficial femoral, popliteal, sapheno-femoral junction, posterior tibial veins, and the contralateral common femoral vein.     PATIENT STATED HISTORY: (As transcribed by Technologist)  Patient has history of DVT and PE.  She is following up on a left popliteal vein DVT.         FINDINGS:    EXTREMITY EXAMINED:  Left lower extremity  SAPHENOFEMORAL JUNCTION:  No reflux.  THROMBI:  There is mild echogenic thrombus identified within the peripheral left popliteal vein.  There is interval improvement compared to the previous exam where there was occlusive thrombus identified within this  vein.  There has been interval   recanalization of the left posterior tibial veins in the proximal and mid calf.  No new DVT is identified.  COMPRESSION:  There is partial compressibility of the peripheral left popliteal vein consistent with chronic nonocclusive thrombus within the lumen.  OTHER:  Negative.                   Impression: CONCLUSION:  Compared to the previous exam there has been interval improvement.  There is still some mild chronic appearing nonocclusive thrombus within the peripheral left popliteal vein.  Previously the thrombus in the popliteal vein was occlusive.    There has been complete recanalization of the left posterior tibial veins in the proximal and mid calf.  There is no acute DVT identified.        LOCATION:  [facility]           Dictated by (CST): Javon Crum MD on 3/05/2024 at 5:29 PM       Finalized by (CST): Javon Crum MD on 3/05/2024 at 5:32 PM         PFTs:       No data to display                   No data to display                    WBC: 5.6, done on 3/11/2024.  HGB: 15.3, done on 3/11/2024.  PLT: 227, done on 3/11/2024.     Glucose: 124, done on 3/11/2024.  Cr: 0.94, done on 3/11/2024.  GFR(AA): 80, done on 6/23/2022.  GFR (non-AA): 69, done on 6/23/2022.  CA: 9.7, done on 3/11/2024.  Na: 141, done on 3/11/2024.  K: 4.2, done on 3/11/2024.  Cl: 110, done on 3/11/2024.  CO2: 28, done on 3/11/2024.  Last ALB was 3.8% done on 3/11/2024.     CT ANGIOGRAPHY, CHEST (CPT=71275)    Result Date: 3/5/2024  CONCLUSION:  1. No acute pulmonary embolus. 2. No acute pulmonary findings.     LOCATION:  [facility]   Dictated by ([facility]): Malathi Leach MD on 3/05/2024 at 6:15 PM     Finalized by (CST): Malathi Leach MD on 3/05/2024 at 6:18 PM       US VENOUS DOPPLER LEG LEFT - DIAG IMG (CPT=93971)    Result Date: 3/5/2024  CONCLUSION:  Compared to the previous exam there has been interval improvement.  There is still some mild chronic appearing nonocclusive thrombus within the  peripheral left popliteal vein.  Previously the thrombus in the popliteal vein was occlusive.  There has been complete recanalization of the left posterior tibial veins in the proximal and mid calf.  There is no acute DVT identified.   LOCATION:  [facility]    Dictated by (CST): Javon Crum MD on 3/05/2024 at 5:29 PM     Finalized by (CST): Javon Crum MD on 3/05/2024 at 5:32 PM         Assessment/Plan:  #1.  Postviral bronchitis  Reviewed pulmonary function test with patient  Has overall resolved  There may be a component of reactive airways/asthma, so I advised her to keep Symbicort nearby in the event that symptoms were to flareup in the spring      #2.  Unprovoked pulmonary embolism  Reviewed CT with patient  Will likely be on lifelong Eliquis    Return if symptoms worsen or fail to improve.    I spent 30 minutes obtaining and reviewing records, preparing for the visit including reviewing chart and prior testing, face to face time examining the patient and obtaining history, counseling, arranging and reviewing office-based testing, independently reviewing relevant studies and documentation exclusive of other billable procedures.      Keisha Noe MD  4/11/2024

## 2024-05-09 ENCOUNTER — TELEPHONE (OUTPATIENT)
Dept: INTERNAL MEDICINE CLINIC | Facility: CLINIC | Age: 64
End: 2024-05-09

## 2024-05-09 DIAGNOSIS — Z13.220 SCREENING FOR LIPID DISORDERS: ICD-10-CM

## 2024-05-09 DIAGNOSIS — Z00.00 ROUTINE GENERAL MEDICAL EXAMINATION AT A HEALTH CARE FACILITY: Primary | ICD-10-CM

## 2024-05-09 RX ORDER — LOSARTAN POTASSIUM AND HYDROCHLOROTHIAZIDE 12.5; 1 MG/1; MG/1
1 TABLET ORAL DAILY
Qty: 90 TABLET | Refills: 3 | Status: SHIPPED | OUTPATIENT
Start: 2024-05-09

## 2024-05-09 NOTE — TELEPHONE ENCOUNTER
REFILL PASSED PER [facility] PROTOCOLS    Requested Prescriptions   Pending Prescriptions Disp Refills    LOSARTAN POTASSIUM-HCTZ 100-12.5 MG Oral Tab [Pharmacy Med Name: LOSARTAN/HCTZ 100/12.5MG TABLETS] 90 tablet 3     Sig: Take 1 tablet by mouth daily.       Hypertension Medications Protocol Passed - 5/8/2024 11:07 AM        Passed - CMP or BMP in past 12 months        Passed - Last BP reading less than 140/90     BP Readings from Last 1 Encounters:   04/11/24 110/66               Passed - In person appointment or virtual visit in the past 12 mos or appointment in next 3 mos     Recent Outpatient Visits              4 weeks ago Allergic rhinitis, unspecified seasonality, unspecified trigger    [facility], [facility] Keisha Noe MD    Office Visit    1 month ago Pelvic pressure in female    [facility], [address] [place] [address] Jessica Valente MD    Office Visit    1 month ago History of pulmonary embolism    [facility] in [place] Nhan Norwood MD    Office Visit    2 months ago Chronic pansinusitis    [facility], [address], [place] Leonel Downey MD    Office Visit    3 months ago Fatigue, unspecified type    [address] Milagro Slaughter APRN    Office Visit          Future Appointments         Provider Department Appt Notes    In 1 week Milagro Slaughter APRN [address] last 5/2/23    In 4 months Nhan Norwood MD [facility] in [place] md olivia                    Passed - EGFRCR or GFRNAA > 50     GFR Evaluation  EGFRCR: 68 , resulted on 3/11/2024               Future Appointments         Provider Department Appt Notes    In 1 week Milagro Slaughter APRN [address] last 5/2/23    In 4 months Nhan Norwood MD [facility] in [place] md olivia          Recent Outpatient Visits              4 weeks ago Allergic rhinitis, unspecified seasonality, unspecified trigger    [facility]    Office Visit    1 month ago Pelvic pressure in female    [facility], [address] [place] [address] Jessica Valente MD    Office Visit    1 month ago History of pulmonary embolism    [facility] [place] Nhan Norwood MD    Office Visit    2 months ago Chronic pansinusitis    [facility], [address], [place]    Office Visit    3 months ago Fatigue, unspecified type    [facility], [address] [place] [address], [place] Milagro Slaughter APRN    Office Visit

## 2024-05-09 NOTE — TELEPHONE ENCOUNTER
Patient called request labs prior to their annual physical.  Annual physical scheduled for annual visit on 5/20/24   Please order labs. Patient preferred lab is [facility]  Patient informed request was sent to clinical team.

## 2024-05-16 ENCOUNTER — LAB ENCOUNTER (OUTPATIENT)
Dept: LAB | Age: 64
End: 2024-05-16
Attending: NURSE PRACTITIONER

## 2024-05-16 DIAGNOSIS — Z00.00 ROUTINE GENERAL MEDICAL EXAMINATION AT A HEALTH CARE FACILITY: ICD-10-CM

## 2024-05-16 DIAGNOSIS — Z13.220 SCREENING FOR LIPID DISORDERS: ICD-10-CM

## 2024-05-16 LAB
CHOLEST SERPL-MCNC: 149 MG/DL (ref ?–200)
FASTING PATIENT LIPID ANSWER: YES
HDLC SERPL-MCNC: 46 MG/DL (ref 40–59)
LDLC SERPL CALC-MCNC: 77 MG/DL (ref ?–100)
NONHDLC SERPL-MCNC: 103 MG/DL (ref ?–130)
TRIGL SERPL-MCNC: 151 MG/DL (ref 30–149)
VLDLC SERPL CALC-MCNC: 23 MG/DL (ref 0–30)

## 2024-05-16 PROCEDURE — 80061 LIPID PANEL: CPT | Performed by: NURSE PRACTITIONER

## 2024-05-18 NOTE — PROGRESS NOTES
Shanae Wagner is a 63 year old female who presents for a complete physical exam:       Patient complains of:       HTN  losartan hct 100/12.5mg.  her bp has been running low.  Had some increased stress which she needed the higer dose but recntly with some dizziness and bp lower  will try 1/2 tab and if acceptable will consider 50/12.5.    Recurrent DVT/PE  DOAC for life  Dr Norwood    Constipation. Stable     Pulmonary  Dr Morales  doing well. Had been cleared to only f/u if issues.   SOB with certain activities.  Using singulair and zyrtec.  Has seen allergy and pulmonary.  ? Component of allergy induced \"asthma\"  will try inhaler.  If persistent, will consider further eval     Dr Canales for Dermatology  needs new dermatologist.  Will see Dr Seaman or Jack.  Has a rash around her mouth.  Will try lotrisone to treat for yeast but if persistnet, will need to see derm.  She is aware to only use lotrisone for a few days max.      Chronic sinus  Dr Downey.      Jessica Valente for gyne    Her daughter has been ill overall doing well    Wt Readings from Last 6 Encounters:   05/20/24 223 lb 12.8 oz (101.5 kg)   04/11/24 223 lb (101.2 kg)   03/26/24 223 lb 15.8 oz (101.6 kg)   03/11/24 224 lb 3.2 oz (101.7 kg)   01/31/24 220 lb (99.8 kg)   01/02/24 218 lb (98.9 kg)       Cholesterol, Total (mg/dL)   Date Value   05/16/2024 149   04/24/2023 162   06/24/2021 157     CHOLESTEROL, TOTAL (mg/dL)   Date Value   02/15/2016 147   11/19/2014 168   08/12/2013 141     HDL Cholesterol (mg/dL)   Date Value   05/16/2024 46   04/24/2023 51   06/24/2021 53     HDL CHOLESTEROL (mg/dL)   Date Value   02/15/2016 51   11/19/2014 47   08/12/2013 42 (L)     LDL Cholesterol (mg/dL)   Date Value   05/16/2024 77   04/24/2023 88   06/24/2021 85     LDL-CHOLESTEROL (mg/dL (calc))   Date Value   02/15/2016 67   11/19/2014 87   08/12/2013 75     AST (U/L)   Date Value   03/11/2024 25   01/31/2024 24   12/10/2023 17   02/15/2016 19   11/19/2014  18   2014 16     ALT (U/L)   Date Value   2024 34   2024 34   12/10/2023 29   02/15/2016 19   2014 16   2014 18        Current Outpatient Medications   Medication Sig Dispense Refill    montelukast (SINGULAIR) 10 MG Oral Tab Take 1 tablet (10 mg total) by mouth daily. 90 tablet 3    clotrimazole-betamethasone 1-0.05 % External Cream Apply 1 Application topically 2 (two) times daily as needed. 30 g 0    Losartan Potassium-HCTZ 100-12.5 MG Oral Tab Take 1 tablet by mouth daily. 90 tablet 3    apixaban 2.5 MG Oral Tab Take 1 tablet (2.5 mg total) by mouth 2 (two) times daily. 180 tablet 2    ipratropium 0.06 % Nasal Solution 1 spray by Nasal route 2 (two) times daily. 1 each 5    clindamycin 1 % External Lotion Apply 2 Applications topically 2 (two) times daily.        Past Medical History:    Acute pharyngitis    Allergic rhinitis    Anemia    Normal now    History of blood clots    7-22    Leg DVT (deep venous thromboembolism), acute, left (HCC)    Personal history of pneumonia (recurrent)    Pulmonary embolism, bilateral (HCC)    Unspecified essential hypertension      Past Surgical History:   Procedure Laterality Date    Colonoscopy N/A 2017    Procedure: COLONOSCOPY, POSSIBLE BIOPSY, POSSIBLE POLYPECTOMY 72419;  Surgeon: Kerwin Velasquez MD;  Location: [facility]    Colonoscopy,biopsy  2012    2 small cecal adenomatous polyps, Procedure: ESOPHAGOGASTRODUODENOSCOPY, COLONOSCOPY, POSSIBLE BIOPSY, POSSIBLE POLYPECTOMY 52317,52774;  Surgeon: Kerwin Velasquez MD;  Location: [facility]    Colonoscopy,diagnostic  2017    normal    Endometr ablate, thermal        6-15-87    I’m    Upper gi endoscopy,diagnosis  2012    wnl, Procedure: ESOPHAGOGASTRODUODENOSCOPY, COLONOSCOPY, POSSIBLE BIOPSY, POSSIBLE POLYPECTOMY 26075,56994;  Surgeon: Kerwin Velasquez MD;  Location: [facility]      Family History   Problem  Relation Age of Onset    Cancer Mother         breast    Breast Cancer Mother 65    DVT/VTE Mother 90        blood clot    Heart Disease Maternal Grandmother     DVT/VTE Maternal Grandmother 90        blood clot    Heart Disease Maternal Grandfather     Hypertension Father     Other (Other) Father            Health Maintenance  Immunizations:  will consider shingrix and with her PMH PCV 20.   Immunization History   Administered Date(s) Administered    TD 03/29/2006    TDAP 02/28/2017     Dental Visits: yes   Colon cancer screening:    Health Maintenance   Topic Date Due    Colorectal Cancer Screening  07/11/2027      Gyne:     Health Maintenance   Topic Date Due    Pap Smear  03/02/2025            History of dysplasia:  No  Menstrual Cycle: post      Breast Cancer Screening: order placed.    Health Maintenance   Topic Date Due    Mammogram  01/25/2023        Bone Density:  dexa ordered.     Osteoperosis Prevention:    Osteoperosis Prevention was discussed.  Reviewed Calcium, Vitamin D supplementation and Weight Bearing Exercises.    Patient is performing weight bearing exercises.  Patient is currently taking Vitamin D supplementation.        REVIEW OF SYSTEMS:   GENERAL: feels well otherwise  SKIN: as above   EYES:denies blurred vision or double vision  HEENT: denies nasal congestion, sinus pain or ST  LUNGS: as above.   CARDIOVASCULAR: denies chest pain on exertion  GI: denies abdominal pain,denies heartburn  : denies dysuria, vaginal discharge or itching  MUSCULOSKELETAL: denies back pain  NEURO: denies headaches  PSYCH: no c/o      EXAM:   /82 (BP Location: Left arm, Patient Position: Sitting, Cuff Size: adult)   Pulse 75   Temp 96.5 °F (35.8 °C) (Skin)   Resp 20   Ht 5' 8\" (1.727 m)   Wt 223 lb 12.8 oz (101.5 kg)   LMP 02/13/2018 (Approximate)   SpO2 98%   BMI 34.03 kg/m²   Body mass index is 34.03 kg/m².   GENERAL: well developed, well nourished,in no apparent distress  SKIN: no rashes,no  suspicious lesions  faint flat erythematous rash corners of mouth and upper lip.  HEENT: atraumatic, normocephalic,ears and throat are clear  EYES:PERRLA, EOMI, conjunctiva are clear  NECK: supple,no adenopathy,  LUNGS: clear to auscultation  CARDIO: RRR without murmur  GI: good BS's,no masses, HSM or tenderness  MUSCULOSKELETAL: back is not tender,  EXTREMITIES: no edema  NEURO: Oriented times three,cranial nerves are intact,motor and sensory are grossly intact    ASSESSMENT AND PLAN:      HEALTH MAINTENANCE:  labs reviewed.  Mammogram due.  Order has been placed.  Gyne for well woman   dexa with mammogram.     Encounter Diagnoses   Name Primary?    Routine general medical examination at a health care facility Yes    Acute deep vein thrombosis (DVT) of proximal vein of left lower extremity (HCC)  per hematology  DOAC     Essential hypertension  stable  try 1/2 100/12.5mg losartan hct.  If stable will reorder with 50/12.5mg   she will message me.      Other chronic pulmonary embolism without acute cor pulmonale (HCC)  stable  DOAC     Recurrent acute deep vein thrombosis (DVT) of left lower extremity (HCC)  stable  DOAC     Multiple thyroid nodules follows with Eli Rucker.       Migraine without aura and with status migrainosus, not intractable  stalbe      Chronic idiopathic constipation  stable       Postmenopausal    Seasonal allergies.  Singulair zyrtec and try albuterol PRN when working in the yard.     No orders of the defined types were placed in this encounter.      Meds & Refills for this Visit:  Requested Prescriptions     Signed Prescriptions Disp Refills    montelukast (SINGULAIR) 10 MG Oral Tab 90 tablet 3     Sig: Take 1 tablet (10 mg total) by mouth daily.    clotrimazole-betamethasone 1-0.05 % External Cream 30 g 0     Sig: Apply 1 Application topically 2 (two) times daily as needed.       Imaging & Consults:  XR DEXA BONE DENSITOMETRY (CPT=77080)    No follow-ups on file.  There are no Patient  Instructions on file for this visit.

## 2024-05-20 ENCOUNTER — OFFICE VISIT (OUTPATIENT)
Dept: INTERNAL MEDICINE CLINIC | Facility: CLINIC | Age: 64
End: 2024-05-20

## 2024-05-20 VITALS
SYSTOLIC BLOOD PRESSURE: 122 MMHG | BODY MASS INDEX: 33.92 KG/M2 | RESPIRATION RATE: 20 BRPM | TEMPERATURE: 97 F | DIASTOLIC BLOOD PRESSURE: 82 MMHG | WEIGHT: 223.81 LBS | OXYGEN SATURATION: 98 % | HEART RATE: 75 BPM | HEIGHT: 68 IN

## 2024-05-20 DIAGNOSIS — E04.2 MULTIPLE THYROID NODULES: ICD-10-CM

## 2024-05-20 DIAGNOSIS — I10 ESSENTIAL HYPERTENSION: ICD-10-CM

## 2024-05-20 DIAGNOSIS — I27.82 OTHER CHRONIC PULMONARY EMBOLISM WITHOUT ACUTE COR PULMONALE (HCC): ICD-10-CM

## 2024-05-20 DIAGNOSIS — K59.04 CHRONIC IDIOPATHIC CONSTIPATION: ICD-10-CM

## 2024-05-20 DIAGNOSIS — J30.2 SEASONAL ALLERGIES: ICD-10-CM

## 2024-05-20 DIAGNOSIS — I82.4Y2 ACUTE DEEP VEIN THROMBOSIS (DVT) OF PROXIMAL VEIN OF LEFT LOWER EXTREMITY (HCC): ICD-10-CM

## 2024-05-20 DIAGNOSIS — G43.001 MIGRAINE WITHOUT AURA AND WITH STATUS MIGRAINOSUS, NOT INTRACTABLE: ICD-10-CM

## 2024-05-20 DIAGNOSIS — Z00.00 ROUTINE GENERAL MEDICAL EXAMINATION AT A HEALTH CARE FACILITY: Primary | ICD-10-CM

## 2024-05-20 DIAGNOSIS — Z78.0 POSTMENOPAUSAL: ICD-10-CM

## 2024-05-20 DIAGNOSIS — I82.402 RECURRENT ACUTE DEEP VEIN THROMBOSIS (DVT) OF LEFT LOWER EXTREMITY (HCC): ICD-10-CM

## 2024-05-20 RX ORDER — MONTELUKAST SODIUM 10 MG/1
10 TABLET ORAL DAILY
Qty: 90 TABLET | Refills: 3 | Status: SHIPPED | OUTPATIENT
Start: 2024-05-20 | End: 2025-05-15

## 2024-05-20 RX ORDER — CLOTRIMAZOLE AND BETAMETHASONE DIPROPIONATE 10; .64 MG/G; MG/G
1 CREAM TOPICAL 2 TIMES DAILY PRN
Qty: 30 G | Refills: 0 | Status: SHIPPED | OUTPATIENT
Start: 2024-05-20 | End: 2024-05-25

## 2024-06-12 ENCOUNTER — NURSE TRIAGE (OUTPATIENT)
Dept: INTERNAL MEDICINE CLINIC | Facility: CLINIC | Age: 64
End: 2024-06-12

## 2024-06-12 NOTE — TELEPHONE ENCOUNTER
Action Requested: Summary for Provider     []  Critical Lab, Recommendations Needed  [] Need Additional Advice  []   FYI    []   Need Orders  [] Need Medications Sent to Pharmacy  []  Other     SUMMARY: Patient reports lower back pain that occurred this morning after lifting some boxes.  She states she is not able to take NSAIDs due to being on blood thinners.  Advised patient of home remedies, including tylenol, ice, stay active, sleep with pillow between knees.  Call with any worsening symptoms.  Patient confirms understanding.     Reason for call: Low Back Pain  Onset: this morning    Patient reports a history of back pain.  Pain is mild now, denies any numbness, tingling, severe pain.  Wanted to check what OTC medications were safe to take.     Reason for Disposition   Caused by a twisting, bending, or lifting injury    Protocols used: Back Pain-A-OH

## 2024-07-08 ENCOUNTER — TELEMEDICINE (OUTPATIENT)
Dept: INTERNAL MEDICINE CLINIC | Facility: CLINIC | Age: 64
End: 2024-07-08
Payer: COMMERCIAL

## 2024-07-08 ENCOUNTER — NURSE TRIAGE (OUTPATIENT)
Dept: INTERNAL MEDICINE CLINIC | Facility: CLINIC | Age: 64
End: 2024-07-08

## 2024-07-08 DIAGNOSIS — U07.1 COVID: Primary | ICD-10-CM

## 2024-07-08 NOTE — PROGRESS NOTES
Shanae Wagner is a 63 year old female.   HPI:    Pt presents with nasal congestion, cough, chills, headache, fatigue.  Symptom onset Thursday 7/4/24.   Subjective fever on Friday 7/5 but this has resolved.   Denies SOB.   Denies CP.   Tested positive for covid on 2 home tests.   On apixaban for recurrent PE.     Allergies:  Allergies   Allergen Reactions    Shellfish NAUSEA AND VOMITING      Current Meds:  Current Outpatient Medications   Medication Sig Dispense Refill    montelukast (SINGULAIR) 10 MG Oral Tab Take 1 tablet (10 mg total) by mouth daily. 90 tablet 3    Losartan Potassium-HCTZ 100-12.5 MG Oral Tab Take 1 tablet by mouth daily. 90 tablet 3    apixaban 2.5 MG Oral Tab Take 1 tablet (2.5 mg total) by mouth 2 (two) times daily. 180 tablet 2    ipratropium 0.06 % Nasal Solution 1 spray by Nasal route 2 (two) times daily. 1 each 5    clindamycin 1 % External Lotion Apply 2 Applications topically 2 (two) times daily.          PMH:     Past Medical History:    Acute pharyngitis    Allergic rhinitis    Anemia    Normal now    History of blood clots    7-22    Leg DVT (deep venous thromboembolism), acute, left (HCC)    Personal history of pneumonia (recurrent)    Pulmonary embolism, bilateral (HCC)    Unspecified essential hypertension         ROS:   Review of Systems   Constitutional:  Positive for chills and fatigue. Negative for fever.   HENT:  Positive for congestion, postnasal drip, rhinorrhea, sinus pressure, sinus pain and sore throat.    Respiratory:  Positive for cough. Negative for shortness of breath and wheezing.    Cardiovascular:  Negative for chest pain.   Neurological:  Positive for headaches. Negative for dizziness and light-headedness.       PHYSICAL EXAM:   No vital signs or physical exam completed for this visit as visit was done via telehealth.       ASSESSMENT/ PLAN:   1. COVID  Reviewed current CDC isolation guidelines   Recommend supportive care  Will need in person eval if symptoms  worsen or if symptoms last for longer than 10 days   Not a candidate for paxlovid due to apixaban use        Health Maintenance Due   Topic Date Due    Zoster Vaccines (1 of 2) Never done    Mammogram  01/25/2023    COVID-19 Vaccine (1 - 2023-24 season) Never done         Pt indicates understanding and agrees to the plan.     No follow-ups on file.    BONNY Cox understands phone evaluation is not a substitute for face-to-face examination or emergency care. Patient advised to go to ER or call 911 for worsening symptoms or acute distress.     Please note that the following visit was completed using two-way, real-time interactive audio and video communication.  This has been done in good [place]ul to provide continuity of care in the best interest of the provider-patient relationship, due to the on-going public health crisis/national emergency and because of restrictions of visitation.  There are limitations of this visit as no physical exam could be performed.  Every conscious effort was taken to allow for sufficient and adequate time.  This billing visit was spent on reviewing labs, medications, radiology tests and decision making.  Appropriate medical decision-making and tests are ordered as detailed in the plan of care above.

## 2024-07-08 NOTE — TELEPHONE ENCOUNTER
Reason for Disposition   COVID-19 infection suspected by caller or triager and mild symptoms (cough, fever, or others) and negative COVID-19 rapid test     Pt tested at home and stated lines were faint. Will test again    Protocols used: Coronavirus (COVID-19) Diagnosed or [id number]    Action Requested: Summary for Provider     []  Critical Lab, Recommendations Needed  [] Need Additional Advice  [x]   FYI    []   Need Orders  [] Need Medications Sent to Pharmacy  []  Other     SUMMARY: [place]ed home Saturday. Symptoms started Friday. C/o congestion, sore throat, slight headache and fatigue. Denies chest pain and SOB. Believes had fever while out of town, which she broke. Tested for Covid at home and stated the lines were faint, there were 2 faint lines. Notified I believe that is a positive test. She stated she can have her  get another test to confirm. I advised to complete repeat as that will help direct care. Taking Tylenol and Coricidin. Scheduled VV with CS today at 10AM. Pt verbalizes understanding and is agreeable to plan.     Reason for call: Covid  Onset: Data Unavailable    Future Appointments   Date Time Provider Department Center   7/8/2024 10:00 AM Ainsley Tolbert PA-C [facility]   9/11/2024  9:30 AM Nhan Norwood MD  [facility]     [facility] 5/20/24

## 2024-07-11 ENCOUNTER — TELEPHONE (OUTPATIENT)
Dept: INTERNAL MEDICINE CLINIC | Facility: CLINIC | Age: 64
End: 2024-07-11

## 2024-07-11 RX ORDER — CLOTRIMAZOLE 10 MG/1
10 LOZENGE ORAL; TOPICAL
Qty: 35 TROCHE | Refills: 0 | Status: SHIPPED | OUTPATIENT
Start: 2024-07-11

## 2024-07-11 NOTE — TELEPHONE ENCOUNTER
Telemed visit with CS 24     Pt called with condition update. Stated she feels like she has a blister in her mouth, mouth feels \"raw\" and painful, however no visible blisters. Can't taste, advised can happen with Covid, fairly common. Using Symbicort, has been swishing and spitting after. States she got thrush last time she used Symbicort. Has leftover Clotrimazole lozenges (24). Pt stated they are not  until . I advised to hold off on taking and let me ask CS for recommendations. Pt also c/o dizziness/lightheadedness. Stated she took her BP and was 106/60, which she states is low for her. Normal 120/70. Eating and drinking normal. I advised pt to push fluids and change positions slowly. Advised if dizziness worsens, needs UC eval. Notified will call back with further recommendations.     CS- Please advise

## 2024-07-12 NOTE — TELEPHONE ENCOUNTER
Called and spoke w/ pt. Notified of CS message below. Pt is appreciative of refill. Stated she felt better yesterday evening, BP went up to 120/73. Denies dizziness/lightheadedness currently. Provided UC warnings for over the weekend. Pt verbalizes understanding and is agreeable to plan

## 2024-07-12 NOTE — TELEPHONE ENCOUNTER
Agree with advice regarding lightheadedness.   Ok to start clotrimazole troches. Refill sent as well.

## 2024-07-20 ENCOUNTER — PATIENT MESSAGE (OUTPATIENT)
Dept: INTERNAL MEDICINE CLINIC | Facility: CLINIC | Age: 64
End: 2024-07-20

## 2024-08-24 ENCOUNTER — HOSPITAL ENCOUNTER (OUTPATIENT)
Dept: BONE DENSITY | Age: 64
Discharge: HOME | End: 2024-08-24
Attending: NURSE PRACTITIONER
Payer: COMMERCIAL

## 2024-08-24 ENCOUNTER — HOSPITAL ENCOUNTER (OUTPATIENT)
Dept: MAMMOGRAPHY | Age: 64
Discharge: HOME | End: 2024-08-24
Attending: NURSE PRACTITIONER
Payer: COMMERCIAL

## 2024-08-24 DIAGNOSIS — Z12.31 ENCOUNTER FOR SCREENING MAMMOGRAM FOR MALIGNANT NEOPLASM OF BREAST: ICD-10-CM

## 2024-08-24 DIAGNOSIS — Z78.0 POSTMENOPAUSAL: ICD-10-CM

## 2024-08-24 PROCEDURE — 77080 DXA BONE DENSITY AXIAL: CPT | Performed by: NURSE PRACTITIONER

## 2024-08-24 PROCEDURE — 77063 BREAST TOMOSYNTHESIS BI: CPT | Performed by: NURSE PRACTITIONER

## 2024-08-24 PROCEDURE — 77067 SCR MAMMO BI INCL CAD: CPT | Performed by: NURSE PRACTITIONER

## 2024-09-11 ENCOUNTER — OFFICE VISIT (OUTPATIENT)
Dept: HEMATOLOGY/ONCOLOGY | Facility: HOSPITAL | Age: 64
End: 2024-09-11
Attending: INTERNAL MEDICINE
Payer: COMMERCIAL

## 2024-09-11 VITALS
DIASTOLIC BLOOD PRESSURE: 81 MMHG | OXYGEN SATURATION: 95 % | WEIGHT: 221 LBS | SYSTOLIC BLOOD PRESSURE: 124 MMHG | TEMPERATURE: 98 F | HEART RATE: 86 BPM | BODY MASS INDEX: 34 KG/M2

## 2024-09-11 DIAGNOSIS — I82.432 ACUTE DEEP VEIN THROMBOSIS (DVT) OF POPLITEAL VEIN OF LEFT LOWER EXTREMITY (HCC): ICD-10-CM

## 2024-09-11 DIAGNOSIS — I82.402 RECURRENT ACUTE DEEP VEIN THROMBOSIS (DVT) OF LEFT LOWER EXTREMITY (HCC): ICD-10-CM

## 2024-09-11 DIAGNOSIS — Z86.718 HISTORY OF DVT IN ADULTHOOD: ICD-10-CM

## 2024-09-11 DIAGNOSIS — I26.99 RECURRENT PULMONARY EMBOLISM (HCC): ICD-10-CM

## 2024-09-11 DIAGNOSIS — I26.99 OTHER ACUTE PULMONARY EMBOLISM WITHOUT ACUTE COR PULMONALE (HCC): ICD-10-CM

## 2024-09-11 DIAGNOSIS — Z86.711 HISTORY OF PULMONARY EMBOLISM: ICD-10-CM

## 2024-09-11 DIAGNOSIS — Z79.01 CHRONIC ANTICOAGULATION: Primary | ICD-10-CM

## 2024-09-11 LAB
ALBUMIN SERPL-MCNC: 4.6 G/DL (ref 3.2–4.8)
ALBUMIN/GLOB SERPL: 1.6 {RATIO} (ref 1–2)
ALP LIVER SERPL-CCNC: 70 U/L
ALT SERPL-CCNC: 28 U/L
ANION GAP SERPL CALC-SCNC: 5 MMOL/L (ref 0–18)
AST SERPL-CCNC: 23 U/L (ref ?–34)
BASOPHILS # BLD AUTO: 0.05 X10(3) UL (ref 0–0.2)
BASOPHILS NFR BLD AUTO: 1.2 %
BILIRUB SERPL-MCNC: 0.8 MG/DL (ref 0.2–1.1)
BUN BLD-MCNC: 12 MG/DL (ref 9–23)
CALCIUM BLD-MCNC: 10.3 MG/DL (ref 8.7–10.4)
CHLORIDE SERPL-SCNC: 105 MMOL/L (ref 98–112)
CO2 SERPL-SCNC: 29 MMOL/L (ref 21–32)
CREAT BLD-MCNC: 0.86 MG/DL
EGFRCR SERPLBLD CKD-EPI 2021: 76 ML/MIN/1.73M2 (ref 60–?)
EOSINOPHIL # BLD AUTO: 0.07 X10(3) UL (ref 0–0.7)
EOSINOPHIL NFR BLD AUTO: 1.7 %
ERYTHROCYTE [DISTWIDTH] IN BLOOD BY AUTOMATED COUNT: 12.5 %
FASTING STATUS PATIENT QL REPORTED: NO
GLOBULIN PLAS-MCNC: 2.9 G/DL (ref 2–3.5)
GLUCOSE BLD-MCNC: 97 MG/DL (ref 70–99)
HCT VFR BLD AUTO: 44.4 %
HGB BLD-MCNC: 15.5 G/DL
IMM GRANULOCYTES # BLD AUTO: 0.01 X10(3) UL (ref 0–1)
IMM GRANULOCYTES NFR BLD: 0.2 %
LYMPHOCYTES # BLD AUTO: 1.21 X10(3) UL (ref 1–4)
LYMPHOCYTES NFR BLD AUTO: 29 %
MCH RBC QN AUTO: 30.1 PG (ref 26–34)
MCHC RBC AUTO-ENTMCNC: 34.9 G/DL (ref 31–37)
MCV RBC AUTO: 86.2 FL
MONOCYTES # BLD AUTO: 0.39 X10(3) UL (ref 0.1–1)
MONOCYTES NFR BLD AUTO: 9.4 %
NEUTROPHILS # BLD AUTO: 2.44 X10 (3) UL (ref 1.5–7.7)
NEUTROPHILS # BLD AUTO: 2.44 X10(3) UL (ref 1.5–7.7)
NEUTROPHILS NFR BLD AUTO: 58.5 %
OSMOLALITY SERPL CALC.SUM OF ELEC: 288 MOSM/KG (ref 275–295)
PLATELET # BLD AUTO: 212 10(3)UL (ref 150–450)
POTASSIUM SERPL-SCNC: 3.8 MMOL/L (ref 3.5–5.1)
PROT SERPL-MCNC: 7.5 G/DL (ref 5.7–8.2)
RBC # BLD AUTO: 5.15 X10(6)UL
SODIUM SERPL-SCNC: 139 MMOL/L (ref 136–145)
WBC # BLD AUTO: 4.2 X10(3) UL (ref 4–11)

## 2024-09-11 PROCEDURE — 99214 OFFICE O/P EST MOD 30 MIN: CPT | Performed by: INTERNAL MEDICINE

## 2024-09-11 NOTE — PROGRESS NOTES
Education Record     Learner:  Patient     Disease / Diagnosis: DVT/PE     Barriers / Limitations:  None                Comments:     Method:  Discussion                Comments:     General Topics:  Plan of care reviewed                Comments:     Outcome:  Shows understanding                Comments: 6 month f/up. Taking eliquis as directed without bleeding complications. Denies any updates to meds or history. No upcoming procedures.

## 2024-09-11 NOTE — PROGRESS NOTES
Hematology Clinic Follow Up Visit    Patient Name: Shanae Wagner  Medical Record Number: [id number]    YOB: 1960    PCP: Vernell Sheldon MD     Reason for Consultation:  Shanae Wagner was seen today for the diagnosis of pulmonary embolism    Hematologic History:  *recurrent PE + LLE DVT  -5/24/22- + for COVID-19  -6/23/22- CTA chest- positive for acute pulmonary emboli within distal lobar, segmental, and subsegmental branches in the right middle lobe, right lower lobe, lingula, and left lower lobe.  There is a tiny acute pulmonary embolism within segmental branch of the right upper lobe.  Overall clot burden is moderate. Small pulmonary infarcts are seen within the lateral inferior aspect of the right middle lobe and right lower lobe. Fatty infiltration of the liver.     -6/24/22- LLE venous doppler- DVT in the left popliteal vein extending to left posterior tibial vein.   -started UFH gtt, discharged on eliquis  -9/20/22- LLE venous doppler- No acute deep vein thrombosis.  Resolution of previously identified popliteal vein thrombus.    -stopped eliquis  -12/20/23- developed recurrent PE + LLE DVT pt reports preceding 2-3 mth period of URI sx but tested negative for COVID-19.   -12/10/23- LLE venous doppler- Findings of deep venous thrombosis with occlusive thrombus of the left distal popliteal vein and proximal/mid segments of the posterior tibial veins.   -12/10/23- CTA chest- Findings of pulmonary embolism with intravascular thrombus identified within lobar/segmental pulmonary arterial branches supplying the right lower lobe and subsegmental arterial branch supplying the left lower lobe    -restarted eliquis  -3/5/24- LLL venous doppler-  Compared to the previous exam there has been interval improvement.  There is still some mild chronic appearing nonocclusive thrombus within the peripheral left popliteal vein.  Previously the thrombus in the popliteal vein was occlusive.  There has been complete  recanalization of the left posterior tibial veins in the proximal and mid calf. There is no acute DVT identified.   -3/5/24- CTA chest- No acute pulmonary embolus.  No acute pulmonary findings.   -3/11/24- switched to maint dosed eliquis 2.5mg BID to be continued indefinitely    ========================================  Interval events: Presents for follow-up.  She has been taking Eliquis 5mg BID since last visit.  She denies any bleeding issues or excessive bruising.  No increased dyspnea or chest pain.  No leg swelling or leg pains.  No upcoming surgeries or procedures.    Past Medical History:  Past Medical History:    Acute pharyngitis    Allergic rhinitis    Anemia    Normal now    History of blood clots    7-22    Leg DVT (deep venous thromboembolism), acute, left (HCC)    Personal history of pneumonia (recurrent)    Pulmonary embolism, bilateral (HCC)    Unspecified essential hypertension     Past Surgical History:   Procedure Laterality Date    Colonoscopy N/A 2017    Procedure: COLONOSCOPY, POSSIBLE BIOPSY, POSSIBLE POLYPECTOMY 83479;  Surgeon: Kerwin Velasquez MD;  Location: [facility]    Colonoscopy,biopsy  2012    2 small cecal adenomatous polyps, Procedure: ESOPHAGOGASTRODUODENOSCOPY, COLONOSCOPY, POSSIBLE BIOPSY, POSSIBLE POLYPECTOMY 39441,92970;  Surgeon: Kerwin Velasquez MD;  Location: [facility]    Colonoscopy,diagnostic  2017    normal    Endometr ablate, thermal        6-15-87    I’m    Upper gi endoscopy,diagnosis  2012    wnl, Procedure: ESOPHAGOGASTRODUODENOSCOPY, COLONOSCOPY, POSSIBLE BIOPSY, POSSIBLE POLYPECTOMY 19500,79224;  Surgeon: Kewrin Velasquez MD;  Location: [facility]       Home Medications:   clotrimazole 10 MG Mouth/Throat Angely Take 1 lozenge (10 mg total) by mouth 5 (five) times daily. 35 Angely 0    montelukast (SINGULAIR) 10 MG Oral Tab Take 1 tablet (10 mg total) by mouth daily. 90 tablet 3     Losartan Potassium-HCTZ 100-12.5 MG Oral Tab Take 1 tablet by mouth daily. 90 tablet 3    apixaban 2.5 MG Oral Tab Take 1 tablet (2.5 mg total) by mouth 2 (two) times daily. 180 tablet 2    ipratropium 0.06 % Nasal Solution 1 spray by Nasal route 2 (two) times daily. 1 each 5    clindamycin 1 % External Lotion Apply 2 Applications topically 2 (two) times daily.       Allergies:   Allergies   Allergen Reactions    Shellfish NAUSEA AND VOMITING       Psychosocial History:  Social History     Social History Narrative    . 2 adult kids, 3 grandkids.  Previously worked as an , quit 5/2022     Social History     Socioeconomic History    Marital status:    Tobacco Use    Smoking status: Never    Smokeless tobacco: Never   Vaping Use    Vaping status: Never Used   Substance and Sexual Activity    Alcohol use: Yes     Alcohol/week: 0.0 - 1.0 standard drinks of alcohol     Comment: cage 7/2/21, occasional, no hx of excessive use    Drug use: No    Sexual activity: Yes   Other Topics Concern    Caffeine Concern No    Exercise Yes     Comment: walking   Social History Narrative    . 2 adult kids, 3 grandkids.  Previously worked as an , quit 5/2022     Family Medical History:  Family History   Problem Relation Age of Onset    Cancer Mother         breast    Breast Cancer Mother 65    DVT/VTE Mother 90        blood clot    Heart Disease Maternal Grandmother     DVT/VTE Maternal Grandmother 90        blood clot    Heart Disease Maternal Grandfather     Hypertension Father     Other (Other) Father      Review of Systems:  A 10-point ROS was done with pertinent positives and negative per the HPI    Vital Signs:  Height: --  Weight: 100.2 kg (221 lb) (09/11 0930)  BSA (Calculated - sq m): --  Pulse: 86 (09/11 0930)  BP: 124/81 (09/11 0930)  Temp: 97.6 °F (36.4 °C) (09/11 0930)  Do Not Use - Resp Rate: --  SpO2: 95 % (09/11 0930)    Wt Readings from Last 6 Encounters:   09/11/24 100.2 kg (221  lb)   05/20/24 101.5 kg (223 lb 12.8 oz)   04/11/24 101.2 kg (223 lb)   03/26/24 101.6 kg (223 lb 15.8 oz)   03/11/24 101.7 kg (224 lb 3.2 oz)   01/31/24 99.8 kg (220 lb)     Physical Examination:  General: Patient is alert and oriented  Psych: Mood and affect are appropriate  Eyes: EOMI  ENT: Oropharynx is clear  CV: Regular rate and rhythm, no murmurs  Respiratory: Lungs clear to auscultation bilaterally  GI/Abd: Soft, non-tender with normoactive bowel sounds, no hepatosplenomegaly  Neurological: Grossly intact   Lymphatics: No palpable lymphadenopathy  Skin: no rashes or petechiae  Ext: no LE edema    Laboratory:  Lab Results   Component Value Date    WBC 4.2 09/11/2024    WBC 5.6 03/11/2024    WBC 6.9 12/10/2023    HGB 15.5 09/11/2024    HGB 15.3 03/11/2024    HGB 15.3 12/10/2023    HCT 44.4 09/11/2024    MCV 86.2 09/11/2024    MCH 30.1 09/11/2024    MCHC 34.9 09/11/2024    RDW 12.5 09/11/2024    .0 09/11/2024    .0 03/11/2024    .0 12/10/2023     Lab Results   Component Value Date    GLU 97 09/11/2024    BUN 12 09/11/2024    BUNCREA 15.1 06/24/2021    CREATSERUM 0.86 09/11/2024    CREATSERUM 0.94 03/11/2024    CREATSERUM 1.04 (H) 01/31/2024    ANIONGAP 5 09/11/2024    GFR 81 02/20/2017    GFRNAA 69 06/23/2022    GFRAA 80 06/23/2022    CA 10.3 09/11/2024    OSMOCALC 288 09/11/2024    ALKPHO 70 09/11/2024    AST 23 09/11/2024    ALT 28 09/11/2024    BILT 0.8 09/11/2024    TP 7.5 09/11/2024    ALB 4.6 09/11/2024    GLOBULIN 2.9 09/11/2024    AGRATIO 1.5 02/15/2016     09/11/2024    K 3.8 09/11/2024     09/11/2024    CO2 29.0 09/11/2024     Lab Results   Component Value Date    PTT 27.1 12/10/2023    PT 13.6 12/30/2011    INR 1.07 12/10/2023     Lab Results   Component Value Date    DDIMER 0.66 (H) 03/11/2024     Component      Latest Ref Rng & Units 6/23/2022   D-Dimer DDU      <400 ng/mL DDU 1,370 (H)     Imaging:    As reviewed above    Impression & Plan:     *History of  recurrent acute pulmonary embolism and LLE popliteal/tibial DVT  -Has had 2 separate acute VTE episodes.  First episode involved PE and LLE popliteal/tibial DVT 6/23/22 that was provoked by COVID-19 infection.  She completed 3 months of Eliquis, repeat LLE venous Doppler on 9/20/22 documented complete resolution of prior DVT.  No repeat chest imaging was performed at that point.  Developed recurrent acute LLE popliteal/tibial DVT with acute PE that was less clearly provoked 12/2023.   -Based on her clinical history I favor she remain on anticoagulation indefinitely given high risk for recurrent VTE.  We discussed that her risk for recurrent VTE off anticoagulation is significantly higher than her risk for bleeding complications on anticoagulation.  She has now completed 3 months of therapeutic anticoagulation with Eliquis and therefore will now transition to maintenance dosing Eliquis at 2.5mg BID to be continued indefinitely.  -Check CBC and CMP q6 months while remains on anticoagulation    RTC in 6 months    Nhan Norwood MD  Hematology/Medical Oncology  [facility]

## 2024-10-01 NOTE — LETTER
07/06/20        [address] [place] [id number]      Dear Sunny Carson,    Our records indicate that you have outstanding lab work and or testing that was ordered for you and has not yet been completed:  Orders Placed This Encounter Yes

## 2024-10-29 ENCOUNTER — OFFICE VISIT (OUTPATIENT)
Dept: INTERNAL MEDICINE CLINIC | Facility: CLINIC | Age: 64
End: 2024-10-29
Payer: COMMERCIAL

## 2024-10-29 VITALS
HEIGHT: 68 IN | OXYGEN SATURATION: 98 % | BODY MASS INDEX: 34 KG/M2 | RESPIRATION RATE: 18 BRPM | SYSTOLIC BLOOD PRESSURE: 120 MMHG | TEMPERATURE: 97 F | DIASTOLIC BLOOD PRESSURE: 70 MMHG | HEART RATE: 75 BPM

## 2024-10-29 DIAGNOSIS — J01.00 ACUTE NON-RECURRENT MAXILLARY SINUSITIS: Primary | ICD-10-CM

## 2024-10-29 DIAGNOSIS — R10.13 DYSPEPSIA: ICD-10-CM

## 2024-10-29 PROCEDURE — G2211 COMPLEX E/M VISIT ADD ON: HCPCS | Performed by: NURSE PRACTITIONER

## 2024-10-29 PROCEDURE — 3074F SYST BP LT 130 MM HG: CPT | Performed by: NURSE PRACTITIONER

## 2024-10-29 PROCEDURE — 99213 OFFICE O/P EST LOW 20 MIN: CPT | Performed by: NURSE PRACTITIONER

## 2024-10-29 PROCEDURE — 3078F DIAST BP <80 MM HG: CPT | Performed by: NURSE PRACTITIONER

## 2024-10-29 RX ORDER — CEFUROXIME AXETIL 500 MG/1
500 TABLET ORAL 2 TIMES DAILY
Qty: 20 TABLET | Refills: 0 | Status: SHIPPED | OUTPATIENT
Start: 2024-10-29

## 2024-10-29 RX ORDER — FAMOTIDINE 20 MG/1
20 TABLET, FILM COATED ORAL 2 TIMES DAILY PRN
Qty: 60 TABLET | Refills: 0 | Status: SHIPPED | OUTPATIENT
Start: 2024-10-29 | End: 2024-11-01

## 2024-10-29 RX ORDER — PREDNISONE 10 MG/1
10 TABLET ORAL DAILY
Qty: 10 TABLET | Refills: 0 | Status: SHIPPED | OUTPATIENT
Start: 2024-10-29

## 2024-10-29 NOTE — PROGRESS NOTES
Shanae Wagner is a 63 year old female.    Chief Complaint   Patient presents with    Sinus Problem     sinus infection green phlegm, pressure behind ears, cough started yesterday morning       HPI:   here for eval of sinus pressure.  Covid negative.  Cough started yesterday.  Sinus pressure for 2 week duration.  She is taking allergy medication since weather cahnged and restarted nasal spray last week.   Last visit with ENT, byron Feb 2024.  She saw an allergist.  Mold allergy.    Hx of sinusitis and last fall was quite ill starting with sinus and progressing to more acute illness.   Initial treatment was augmentin.   No fever or chills.   Left ear pain.   Last few days with upper abd burning sensation but no pain.  No nausea     Patient Active Problem List   Diagnosis    Degeneration of lumbar or lumbosacral intervertebral disc    Essential hypertension    Rosacea    Multiple thyroid nodules    Migraines    Tremor    History of pulmonary embolism    History of DVT in adulthood    Family history of blood clots    Acute deep vein thrombosis (DVT) of proximal vein of lower extremity (HCC)    Recurrent acute deep vein thrombosis (DVT) of left lower extremity (HCC)    Pulmonary embolus (HCC)    Chronic idiopathic constipation    Intramural uterine fibroid    Pelvic pressure in female    Seasonal allergies    Chronic anticoagulation     Current Outpatient Medications   Medication Sig Dispense Refill    cefuroxime 500 MG Oral Tab Take 1 tablet (500 mg total) by mouth 2 (two) times daily. 20 tablet 0    predniSONE 10 MG Oral Tab Take 1 tablet (10 mg total) by mouth daily. 10 tablet 0    famotidine 20 MG Oral Tab Take 1 tablet (20 mg total) by mouth 2 (two) times daily as needed for Heartburn. 60 tablet 0    apixaban 2.5 MG Oral Tab Take 1 tablet (2.5 mg total) by mouth 2 (two) times daily. 180 tablet 2    clotrimazole 10 MG Mouth/Throat Angely Take 1 lozenge (10 mg total) by mouth 5 (five) times daily. 35 Angely 0     montelukast (SINGULAIR) 10 MG Oral Tab Take 1 tablet (10 mg total) by mouth daily. 90 tablet 3    Losartan Potassium-HCTZ 100-12.5 MG Oral Tab Take 1 tablet by mouth daily. 90 tablet 3    ipratropium 0.06 % Nasal Solution 1 spray by Nasal route 2 (two) times daily. 1 each 5    clindamycin 1 % External Lotion Apply 2 Applications topically 2 (two) times daily.        Past Medical History:    Acute pharyngitis    Allergic rhinitis    Anemia    Normal now    History of blood clots    7-22    Leg DVT (deep venous thromboembolism), acute, left (HCC)    Personal history of pneumonia (recurrent)    Pulmonary embolism, bilateral (HCC)    Unspecified essential hypertension      Social History:  Social History     Socioeconomic History    Marital status:    Tobacco Use    Smoking status: Never    Smokeless tobacco: Never   Vaping Use    Vaping status: Never Used   Substance and Sexual Activity    Alcohol use: Yes     Alcohol/week: 0.0 - 1.0 standard drinks of alcohol     Comment: cage 7/2/21, occasional, no hx of excessive use    Drug use: No    Sexual activity: Yes   Other Topics Concern    Caffeine Concern No    Stress Concern No    Weight Concern Yes    Special Diet No    Exercise Yes    Seat Belt Yes   Social History Narrative    . 2 adult kids, 3 grandkids.  Previously worked as an , quit 5/2022     Family History   Problem Relation Age of Onset    Cancer Mother         breast    Breast Cancer Mother 65    DVT/VTE Mother 90        blood clot    Heart Disease Maternal Grandmother     DVT/VTE Maternal Grandmother 90        blood clot    Heart Disease Maternal Grandfather     Hypertension Father     Other (Other) Father         Allergies  Allergies[1]    REVIEW OF SYSTEMS:   GENERAL HEALTH: as above   RESPIRATORY: denies shortness of breath with exertion, cough  CARDIOVASCULAR: denies chest pain on exertion, no palpatations  GI: as above.   NEURO:  headaches,     EXAM:   /70   Pulse 75   Temp  97.1 °F (36.2 °C) (Temporal)   Resp 18   Ht 5' 8\" (1.727 m)   LMP 02/13/2018 (Approximate)   SpO2 98%   BMI 33.60 kg/m²   GENERAL: well developed, well nourished,in no apparent distress  HEENT: atraumatic, normocephalic,ears bilataerl fluid filled TM iwthout erythema  maxillary sinus tendernss  throat clear   NECK: supple,no adenopathy,  LUNGS: normal rate without respiratory distress, lungs clear to auscultation no wheezing.    CARDIO: RRR without murmur  GI: normal bowel sounds, no masses, HSM or tenderness  soft   EXTREMITIES: no edema, normal strength and tone  PSYCH: alert and oriented x 3    ASSESSMENT AND PLAN:     Encounter Diagnoses   Name Primary?    Acute non-recurrent maxillary sinusitis  ceftin with low dose prednisone.  Continue singulari antihistamine and nasal spray.   Yes    Dyspepsia  pepcid BID while taking antibiotic and prednisone then PRN.  Not to take at the same time as her antibiotic.          No orders of the defined types were placed in this encounter.      Meds & Refills for this Visit:  Requested Prescriptions     Signed Prescriptions Disp Refills    cefuroxime 500 MG Oral Tab 20 tablet 0     Sig: Take 1 tablet (500 mg total) by mouth 2 (two) times daily.    predniSONE 10 MG Oral Tab 10 tablet 0     Sig: Take 1 tablet (10 mg total) by mouth daily.    famotidine 20 MG Oral Tab 60 tablet 0     Sig: Take 1 tablet (20 mg total) by mouth 2 (two) times daily as needed for Heartburn.       Imaging & Consults:  None    No follow-ups on file.  There are no Patient Instructions on file for this visit.       [1]   Allergies  Allergen Reactions    Shellfish NAUSEA AND VOMITING

## 2024-11-01 RX ORDER — FAMOTIDINE 20 MG/1
20 TABLET, FILM COATED ORAL 2 TIMES DAILY PRN
Qty: 180 TABLET | Refills: 0 | Status: SHIPPED | OUTPATIENT
Start: 2024-11-01

## 2024-11-01 NOTE — TELEPHONE ENCOUNTER
Refill passed per [facility] protocol.    Patient requesting 90 day supply.    Please review pended refill request as unable to refill due to high/very high interaction warning copied here:      Very High  Drug-Drug: cefuroxime and famotidinePharmacologic effects and plasma concentrations of cefuroxime may be decreased by Acid Suppressing Medications.  Details  Override reason        famotidine 20 MG Oral Tab [Pharmacy Med Name: FAMOTIDINE 20MG TABLETS]  Prescription. Reordered.  cefuroxime 500 MG Oral TabPrescription. Active.        Requested Prescriptions   Pending Prescriptions Disp Refills    FAMOTIDINE 20 MG Oral Tab [Pharmacy Med Name: FAMOTIDINE 20MG TABLETS] 180 tablet 0     Sig: TAKE 1 TABLET(20 MG) BY MOUTH TWICE DAILY AS NEEDED FOR HEARTBURN       Gastrointestional Medication Protocol Passed - 11/1/2024 12:11 PM        Passed - In person appointment or virtual visit in the past 12 mos or appointment in next 3 mos     Recent Outpatient Visits              3 days ago Acute non-recurrent maxillary sinusitis    [address] Milagro Slaughter APRN    Office Visit    1 month ago Chronic anticoagulation    [facility] [place] Nhan Norwood MD    Office Visit    3 months ago COVID    [address] Ainsley Tolbert PA-C    Telemedicine    5 months ago Routine general medical examination at a health care facility    [address] Milagro Slaughter APRN    Office Visit    6 months ago Allergic rhinitis, unspecified seasonality, unspecified trigger    [facility] Keisha Noe MD    Office Visit          Future Appointments         Provider Department Appt Notes    In 4 months Nhan Norwood MD [facility] [place] in [place] pl,md                       Recent Outpatient Visits               3 days ago Acute non-recurrent maxillary sinusitis    [facility], [address] [place] [address] Milagro Slaughter APRN    Office Visit    1 month ago Chronic anticoagulation    [facility] in [place] Nhan Norwood MD    Office Visit    3 months ago COVID    [facility], [address] [place] [address] Ainsley Tolbert PA-C    Telemedicine    5 months ago Routine general medical examination at a health care facility    [facility], [address] [place] [address] Milagro Slaughter APRN    Office Visit    6 months ago Allergic rhinitis, unspecified seasonality, unspecified trigger    [facility], [facility] Keisha Noe MD    Office Visit          Future Appointments         Provider Department Appt Notes    In 4 months Nhan Norwood MD [facility] in [place] md olivia

## 2025-02-19 ENCOUNTER — OFFICE VISIT (OUTPATIENT)
Dept: INTERNAL MEDICINE CLINIC | Facility: CLINIC | Age: 65
End: 2025-02-19
Payer: COMMERCIAL

## 2025-02-19 VITALS
TEMPERATURE: 97 F | DIASTOLIC BLOOD PRESSURE: 70 MMHG | SYSTOLIC BLOOD PRESSURE: 118 MMHG | RESPIRATION RATE: 18 BRPM | OXYGEN SATURATION: 98 % | HEIGHT: 68 IN | BODY MASS INDEX: 33.92 KG/M2 | HEART RATE: 70 BPM | WEIGHT: 223.81 LBS

## 2025-02-19 DIAGNOSIS — K21.9 GASTROESOPHAGEAL REFLUX DISEASE, UNSPECIFIED WHETHER ESOPHAGITIS PRESENT: ICD-10-CM

## 2025-02-19 DIAGNOSIS — R21 RASH: ICD-10-CM

## 2025-02-19 DIAGNOSIS — R10.11 RUQ ABDOMINAL PAIN: Primary | ICD-10-CM

## 2025-02-19 DIAGNOSIS — R60.0 BILATERAL LEG EDEMA: ICD-10-CM

## 2025-02-19 PROCEDURE — 99214 OFFICE O/P EST MOD 30 MIN: CPT | Performed by: PHYSICIAN ASSISTANT

## 2025-02-19 PROCEDURE — 3074F SYST BP LT 130 MM HG: CPT | Performed by: PHYSICIAN ASSISTANT

## 2025-02-19 PROCEDURE — G2211 COMPLEX E/M VISIT ADD ON: HCPCS | Performed by: PHYSICIAN ASSISTANT

## 2025-02-19 PROCEDURE — 3008F BODY MASS INDEX DOCD: CPT | Performed by: PHYSICIAN ASSISTANT

## 2025-02-19 PROCEDURE — 3078F DIAST BP <80 MM HG: CPT | Performed by: PHYSICIAN ASSISTANT

## 2025-02-19 RX ORDER — TRIAMCINOLONE ACETONIDE 1 MG/G
CREAM TOPICAL
Qty: 30 G | Refills: 1 | Status: SHIPPED | OUTPATIENT
Start: 2025-02-19

## 2025-02-19 NOTE — PROGRESS NOTES
Shanae Wagner is a 64 year old female.   Chief Complaint   Patient presents with    Abdominal Pain     EJ Rm 8- Pt is here for rt side pain only pt is sleeping it started a month ago    Leg Swelling     Here for bilateral leg swelling that started yesterday     Rash     Here for a rash on lower part of both legs     HPI:    Pt presents with the following:    Right side pain x 1 month feels under ribs on right side.   Only bothersome at night. Does not feel at all during the day. Worse with certain positions at night.  No rash.   No pain with deep breaths.   No sob.   Denies urinary symptoms. Denies N/V/D. Denies constipation.     Bilateral leg swelling yesterday night. She notes she was in the car for 2 hours and then walked around a location for about 2.5 hours and then drove home. Noted swelling last night. Symptoms were equal bilateral. Denies pain. She also developed a rash on both lower legs last night. Rash was itchy. Swelling has resolved and rash is better but still present.   She takes eliquis due to h/o clots and she has been compliant with this therapy.     GERD. Chronic issue. She takes famotidine bid with some benefit. Worse at night.   Had an EGD around age 50.     Allergies:  Allergies[1]   Current Meds:  Current Outpatient Medications   Medication Sig Dispense Refill    triamcinolone 0.1 % External Cream Apply to affected area bid prn. 30 g 1    famotidine 20 MG Oral Tab Take 1 tablet (20 mg total) by mouth 2 (two) times daily as needed for Heartburn. 180 tablet 0    apixaban 2.5 MG Oral Tab Take 1 tablet (2.5 mg total) by mouth 2 (two) times daily. 180 tablet 2    Losartan Potassium-HCTZ 100-12.5 MG Oral Tab Take 1 tablet by mouth daily. 90 tablet 3    cefuroxime 500 MG Oral Tab Take 1 tablet (500 mg total) by mouth 2 (two) times daily. (Patient not taking: Reported on 2/19/2025) 20 tablet 0    predniSONE 10 MG Oral Tab Take 1 tablet (10 mg total) by mouth daily. (Patient not taking: Reported on  2/19/2025) 10 tablet 0    clotrimazole 10 MG Mouth/Throat Angely Take 1 lozenge (10 mg total) by mouth 5 (five) times daily. (Patient not taking: Reported on 2/19/2025) 35 Angely 0    montelukast (SINGULAIR) 10 MG Oral Tab Take 1 tablet (10 mg total) by mouth daily. (Patient not taking: Reported on 2/19/2025) 90 tablet 3    ipratropium 0.06 % Nasal Solution 1 spray by Nasal route 2 (two) times daily. (Patient not taking: Reported on 2/19/2025) 1 each 5    clindamycin 1 % External Lotion Apply 2 Applications topically 2 (two) times daily. (Patient not taking: Reported on 2/19/2025)          PMH:     Past Medical History:    Acute pharyngitis    Allergic rhinitis    Anemia    Normal now    History of blood clots    7-22    Leg DVT (deep venous thromboembolism), acute, left (HCC)    Personal history of pneumonia (recurrent)    Pulmonary embolism, bilateral (HCC)    Unspecified essential hypertension       ROS:   GENERAL: Negative for fever, chills and fatigue. NAD.  HENT: Negative for congestion, sore throat, and ear pain.  RESPIRATORY: Negative for cough, chest tightness, shortness of breath and wheezing.    CV: Negative for chest pain, palpitations and leg swelling.   GI: Negative for nausea, vomiting, diarrhea, and blood in stool.   : Negative for dysuria, hematuria and difficulty urinating.   MUSCULOSKELETAL: Negative for myalgias, back pain, joint swelling, arthralgias and gait problem.   NEURO: Negative for dizziness, syncope, weakness, numbness, tingling and headaches.   PSYCH: The patient is not nervous/anxious. No depression.      PHYSICAL EXAM:    /70   Pulse 70   Temp 96.9 °F (36.1 °C) (Temporal)   Resp 18   Ht 5' 8\" (1.727 m)   Wt 223 lb 12.8 oz (101.5 kg)   LMP 02/13/2018 (Approximate)   SpO2 98%   BMI 34.03 kg/m²     GENERAL: NAD. A&Ox3  SKIN: left lower leg with blanching erythematous rash, no vesicles  RESPIRATORY: CTAB, no R/R/W  CV: RRR, no murmurs.   ABD: Soft, non-tender,  non-distended. +bowel sounds. No rebound tenderness.   BACK: no CVA tenderness   NEURO: No focal deficits.   MUSCULOSKELETAL: Full ROM of all extremities. No swelling.   PSYCH: Appropriate mood and affect.      ASSESSMENT/ PLAN:   1. RUQ abdominal pain  Her pain is intermittent but seems to be RUQ   Will check US to further eval   - US ABDOMEN LIMITED (CPT=76705); Future    2. Gastroesophageal reflux disease, unspecified whether esophagitis present  Continue famotidine bid   Reviewed lifestyle changes   See GI for possible EGD if persists     3. Bilateral leg edema  Resolved   Reviewed low salt diet, leg elevation, and compression socks   Discussed wearing compression socks when traveling   Will needs US if recurs given h/o clots     4. Rash  Possibly related to swelling  Recommend triamcinolone bid prn   F/u if persists        Health Maintenance Due   Topic Date Due    Pneumococcal Vaccine: 50+ Years (1 of 1 - PCV) Never done    Zoster Vaccines (1 of 2) Never done    COVID-19 Vaccine (1 - 2024-25 season) Never done    Influenza Vaccine (1) Never done    Annual Depression Screening  01/01/2025    Pap Smear  03/02/2025           Pt indicates understanding and agrees to the plan.     Return if symptoms worsen or fail to improve.    Ainsley Tolbert PA-C           [1]   Allergies  Allergen Reactions    Shellfish NAUSEA AND VOMITING

## 2025-02-20 ENCOUNTER — HOSPITAL ENCOUNTER (OUTPATIENT)
Dept: ULTRASOUND IMAGING | Age: 65
Discharge: HOME | End: 2025-02-20
Attending: PHYSICIAN ASSISTANT
Payer: COMMERCIAL

## 2025-02-20 DIAGNOSIS — R10.11 RUQ ABDOMINAL PAIN: ICD-10-CM

## 2025-02-20 PROCEDURE — 76700 US EXAM ABDOM COMPLETE: CPT | Performed by: PHYSICIAN ASSISTANT

## 2025-02-28 DIAGNOSIS — K82.8 GALLBLADDER SLUDGE: Primary | ICD-10-CM

## 2025-03-12 ENCOUNTER — OFFICE VISIT (OUTPATIENT)
Age: 65
End: 2025-03-12
Attending: INTERNAL MEDICINE
Payer: COMMERCIAL

## 2025-03-12 VITALS
SYSTOLIC BLOOD PRESSURE: 142 MMHG | WEIGHT: 221.63 LBS | HEART RATE: 72 BPM | BODY MASS INDEX: 33.59 KG/M2 | RESPIRATION RATE: 16 BRPM | OXYGEN SATURATION: 97 % | TEMPERATURE: 97 F | HEIGHT: 67.99 IN | DIASTOLIC BLOOD PRESSURE: 85 MMHG

## 2025-03-12 DIAGNOSIS — I82.402 RECURRENT ACUTE DEEP VEIN THROMBOSIS (DVT) OF LEFT LOWER EXTREMITY (HCC): ICD-10-CM

## 2025-03-12 DIAGNOSIS — I26.99 RECURRENT PULMONARY EMBOLISM (HCC): ICD-10-CM

## 2025-03-12 DIAGNOSIS — I82.402 RECURRENT ACUTE DEEP VEIN THROMBOSIS (DVT) OF LEFT LOWER EXTREMITY (HCC): Primary | ICD-10-CM

## 2025-03-12 DIAGNOSIS — Z86.711 HISTORY OF PULMONARY EMBOLISM: ICD-10-CM

## 2025-03-12 DIAGNOSIS — Z86.718 HISTORY OF DVT IN ADULTHOOD: ICD-10-CM

## 2025-03-12 DIAGNOSIS — Z79.01 CHRONIC ANTICOAGULATION: ICD-10-CM

## 2025-03-12 LAB
ALBUMIN SERPL-MCNC: 4.7 G/DL (ref 3.2–4.8)
ALBUMIN/GLOB SERPL: 1.7 {RATIO} (ref 1–2)
ALP LIVER SERPL-CCNC: 63 U/L
ALT SERPL-CCNC: 34 U/L
ANION GAP SERPL CALC-SCNC: 7 MMOL/L (ref 0–18)
AST SERPL-CCNC: 28 U/L (ref ?–34)
BASOPHILS # BLD AUTO: 0.06 X10(3) UL (ref 0–0.2)
BASOPHILS NFR BLD AUTO: 1.3 %
BILIRUB SERPL-MCNC: 0.8 MG/DL (ref 0.2–1.1)
BUN BLD-MCNC: 11 MG/DL (ref 9–23)
CALCIUM BLD-MCNC: 10.3 MG/DL (ref 8.7–10.6)
CHLORIDE SERPL-SCNC: 102 MMOL/L (ref 98–112)
CO2 SERPL-SCNC: 32 MMOL/L (ref 21–32)
CREAT BLD-MCNC: 1.03 MG/DL
EGFRCR SERPLBLD CKD-EPI 2021: 61 ML/MIN/1.73M2 (ref 60–?)
EOSINOPHIL # BLD AUTO: 0.09 X10(3) UL (ref 0–0.7)
EOSINOPHIL NFR BLD AUTO: 1.9 %
ERYTHROCYTE [DISTWIDTH] IN BLOOD BY AUTOMATED COUNT: 11.9 %
GLOBULIN PLAS-MCNC: 2.7 G/DL (ref 2–3.5)
GLUCOSE BLD-MCNC: 115 MG/DL (ref 70–99)
HCT VFR BLD AUTO: 43.7 %
HGB BLD-MCNC: 15.5 G/DL
IMM GRANULOCYTES # BLD AUTO: 0 X10(3) UL (ref 0–1)
IMM GRANULOCYTES NFR BLD: 0 %
LYMPHOCYTES # BLD AUTO: 1.5 X10(3) UL (ref 1–4)
LYMPHOCYTES NFR BLD AUTO: 31.8 %
MCH RBC QN AUTO: 30.3 PG (ref 26–34)
MCHC RBC AUTO-ENTMCNC: 35.5 G/DL (ref 31–37)
MCV RBC AUTO: 85.5 FL
MONOCYTES # BLD AUTO: 0.42 X10(3) UL (ref 0.1–1)
MONOCYTES NFR BLD AUTO: 8.9 %
NEUTROPHILS # BLD AUTO: 2.64 X10 (3) UL (ref 1.5–7.7)
NEUTROPHILS # BLD AUTO: 2.64 X10(3) UL (ref 1.5–7.7)
NEUTROPHILS NFR BLD AUTO: 56.1 %
OSMOLALITY SERPL CALC.SUM OF ELEC: 292 MOSM/KG (ref 275–295)
PLATELET # BLD AUTO: 253 10(3)UL (ref 150–450)
POTASSIUM SERPL-SCNC: 3.7 MMOL/L (ref 3.5–5.1)
PROT SERPL-MCNC: 7.4 G/DL (ref 5.7–8.2)
RBC # BLD AUTO: 5.11 X10(6)UL
SODIUM SERPL-SCNC: 141 MMOL/L (ref 136–145)
WBC # BLD AUTO: 4.7 X10(3) UL (ref 4–11)

## 2025-03-12 NOTE — PROGRESS NOTES
Hematology Clinic Follow Up Visit    Patient Name: Shanae Wagner  Medical Record Number: [id number]    YOB: 1960    PCP: Vernell Sheldon MD     Reason for Consultation:  Shanae Wagner was seen today for the diagnosis of pulmonary embolism    Hematologic History:  *recurrent PE + LLE DVT  -5/24/22- + for COVID-19  -6/23/22- CTA chest- positive for acute pulmonary emboli within distal lobar, segmental, and subsegmental branches in the right middle lobe, right lower lobe, lingula, and left lower lobe.  There is a tiny acute pulmonary embolism within segmental branch of the right upper lobe.  Overall clot burden is moderate. Small pulmonary infarcts are seen within the lateral inferior aspect of the right middle lobe and right lower lobe. Fatty infiltration of the liver.     -6/24/22- LLE venous doppler- DVT in the left popliteal vein extending to left posterior tibial vein.   -started UFH gtt, discharged on eliquis  -9/20/22- LLE venous doppler- No acute deep vein thrombosis.  Resolution of previously identified popliteal vein thrombus.    -stopped eliquis  -12/20/23- developed recurrent PE + LLE DVT pt reports preceding 2-3 mth period of URI sx but tested negative for COVID-19.   -12/10/23- LLE venous doppler- Findings of deep venous thrombosis with occlusive thrombus of the left distal popliteal vein and proximal/mid segments of the posterior tibial veins.   -12/10/23- CTA chest- Findings of pulmonary embolism with intravascular thrombus identified within lobar/segmental pulmonary arterial branches supplying the right lower lobe and subsegmental arterial branch supplying the left lower lobe    -restarted eliquis  -3/5/24- LLL venous doppler-  Compared to the previous exam there has been interval improvement.  There is still some mild chronic appearing nonocclusive thrombus within the peripheral left popliteal vein.  Previously the thrombus in the popliteal vein was occlusive.  There has been complete  recanalization of the left posterior tibial veins in the proximal and mid calf. There is no acute DVT identified.   -3/5/24- CTA chest- No acute pulmonary embolus.  No acute pulmonary findings.   -3/11/24- switched to maint dosed eliquis 2.5mg BID to be continued indefinitely    ========================================  Interval events: Presents for follow-up.  She has been taking Eliquis 2.5mg BID since last visit.  She denies any bleeding issues or excessive bruising.  No increased dyspnea or chest pain.  No persistent leg swelling or leg pains.     She is scheduled for an outpatient laparoscopic cholecystectomy next week on 3/21/25 with Dr. Eli Rucker.    Past Medical History:  Past Medical History:    Acute pharyngitis    Allergic rhinitis    Anemia    Normal now    History of blood clots    7-22    Leg DVT (deep venous thromboembolism), acute, left (HCC)    Personal history of pneumonia (recurrent)    Pulmonary embolism, bilateral (HCC)    Unspecified essential hypertension     Past Surgical History:   Procedure Laterality Date    Colonoscopy N/A 2017    Procedure: COLONOSCOPY, POSSIBLE BIOPSY, POSSIBLE POLYPECTOMY 68047;  Surgeon: Kerwin Velasquez MD;  Location: [facility]    Colonoscopy,biopsy  2012    2 small cecal adenomatous polyps, Procedure: ESOPHAGOGASTRODUODENOSCOPY, COLONOSCOPY, POSSIBLE BIOPSY, POSSIBLE POLYPECTOMY 79851,54724;  Surgeon: Kerwin Velasquez MD;  Location: [facility]    Colonoscopy,diagnostic  2017    normal    Endometr ablate, thermal        615    I’m    Upper gi endoscopy,diagnosis  2012    wnl, Procedure: ESOPHAGOGASTRODUODENOSCOPY, COLONOSCOPY, POSSIBLE BIOPSY, POSSIBLE POLYPECTOMY 46118,40533;  Surgeon: Kerwin Velasquez MD;  Location: [facility]       Home Medications:   triamcinolone 0.1 % External Cream Apply to affected area bid prn. 30 g 1    apixaban 2.5 MG Oral Tab Take 1 tablet (2.5 mg  total) by mouth 2 (two) times daily. 180 tablet 2    Losartan Potassium-HCTZ 100-12.5 MG Oral Tab Take 1 tablet by mouth daily. 90 tablet 3     Allergies:   Allergies   Allergen Reactions    Shellfish NAUSEA AND VOMITING       Psychosocial History:  Social History     Social History Narrative    . 2 adult kids, 3 grandkids.  Previously worked as an , quit 5/2022     Social History     Socioeconomic History    Marital status:    Tobacco Use    Smoking status: Never    Smokeless tobacco: Never   Vaping Use    Vaping status: Never Used   Substance and Sexual Activity    Alcohol use: Yes     Alcohol/week: 0.0 - 1.0 standard drinks of alcohol     Comment: cage 7/2/21, occasional, no hx of excessive use    Drug use: No    Sexual activity: Yes   Other Topics Concern    Caffeine Concern No    Stress Concern No    Weight Concern Yes    Special Diet No    Exercise Yes    Seat Belt Yes   Social History Narrative    . 2 adult kids, 3 grandkids.  Previously worked as an , quit 5/2022     Social Drivers of Health     Food Insecurity: No Food Insecurity (2/19/2025)    NCSS - Food Insecurity     Worried About Running Out of Food in the Last Year: No     Ran Out of Food in the Last Year: No   Transportation Needs: No Transportation Needs (2/19/2025)    NCSS - Transportation     Lack of Transportation: No   Housing Stability: Not At Risk (2/19/2025)    NCSS - Housing/Utilities     Has Housing: Yes     Worried About Losing Housing: No     Unable to Get Utilities: No     Family Medical History:  Family History   Problem Relation Age of Onset    Cancer Mother         breast    Breast Cancer Mother 65    DVT/VTE Mother 90        blood clot    Heart Disease Maternal Grandmother     DVT/VTE Maternal Grandmother 90        blood clot    Heart Disease Maternal Grandfather     Hypertension Father     Other (Other) Father      Review of Systems:  A 10-point ROS was done with pertinent positives and  negative per the HPI    Vital Signs:  Height: 172.7 cm (5' 7.99\") (03/12 0938)  Weight: 100.5 kg (221 lb 9.6 oz) (03/12 0938)  BSA (Calculated - sq m): 2.13 sq meters (03/12 0938)  Pulse: 72 (03/12 0938)  BP: 142/85 (03/12 0938)  Temp: 96.8 °F (36 °C) (03/12 0938)  Do Not Use - Resp Rate: --  SpO2: 97 % (03/12 0938)    Wt Readings from Last 6 Encounters:   03/12/25 100.5 kg (221 lb 9.6 oz)   02/19/25 101.5 kg (223 lb 12.8 oz)   09/11/24 100.2 kg (221 lb)   05/20/24 101.5 kg (223 lb 12.8 oz)   04/11/24 101.2 kg (223 lb)   03/26/24 101.6 kg (223 lb 15.8 oz)     Physical Examination:  General: Patient is alert and oriented  Psych: Mood and affect are appropriate  Eyes: EOMI  ENT: Oropharynx is clear  CV: Regular rate and rhythm, no murmurs  Respiratory: Lungs clear to auscultation bilaterally  GI/Abd: Soft, non-tender with normoactive bowel sounds, no hepatosplenomegaly  Neurological: Grossly intact   Lymphatics: No palpable lymphadenopathy  Skin: no rashes or petechiae  Ext: no LE edema    Laboratory:  Lab Results   Component Value Date    WBC 4.7 03/12/2025    WBC 4.2 09/11/2024    WBC 5.6 03/11/2024    HGB 15.5 03/12/2025    HGB 15.5 09/11/2024    HGB 15.3 03/11/2024    HCT 43.7 03/12/2025    MCV 85.5 03/12/2025    MCH 30.3 03/12/2025    MCHC 35.5 03/12/2025    RDW 11.9 03/12/2025    .0 03/12/2025    .0 09/11/2024    .0 03/11/2024     Lab Results   Component Value Date     (H) 03/12/2025    BUN 11 03/12/2025    BUNCREA 15.1 06/24/2021    CREATSERUM 1.03 (H) 03/12/2025    CREATSERUM 0.86 09/11/2024    CREATSERUM 0.94 03/11/2024    ANIONGAP 7 03/12/2025    GFR 81 02/20/2017    GFRNAA 69 06/23/2022    GFRAA 80 06/23/2022    CA 10.3 03/12/2025    OSMOCALC 292 03/12/2025    ALKPHO 63 03/12/2025    AST 28 03/12/2025    ALT 34 03/12/2025    BILT 0.8 03/12/2025    TP 7.4 03/12/2025    ALB 4.7 03/12/2025    GLOBULIN 2.7 03/12/2025    AGRATIO 1.5 02/15/2016     03/12/2025    K 3.7 03/12/2025      03/12/2025    CO2 32.0 03/12/2025     Lab Results   Component Value Date    PTT 27.1 12/10/2023    PT 13.6 12/30/2011    INR 1.07 12/10/2023     Lab Results   Component Value Date    DDIMER 0.66 (H) 03/11/2024     Component      Latest Ref Rng & Units 6/23/2022   D-Dimer DDU      <400 ng/mL DDU 1,370 (H)     Imaging:    As reviewed above    Impression & Plan:     *History of recurrent acute pulmonary embolism and LLE popliteal/tibial DVT  -Has had 2 separate acute VTE episodes.  First episode involved PE and LLE popliteal/tibial DVT 6/23/22 that was provoked by COVID-19 infection.  She completed 3 months of Eliquis, repeat LLE venous Doppler on 9/20/22 documented complete resolution of prior DVT.  No repeat chest imaging was performed at that point.  Developed recurrent acute LLE popliteal/tibial DVT with acute PE that was less clearly provoked 12/2023.   -Given high risk for recurrent VTE she transitioned to maintenance Eliquis dosing since 9/2024.    -Continue Eliquis at 2.5mg BID to be continued indefinitely.  -Check CBC and CMP q6 months while remains on anticoagulation    *Anticoagulation management for upcoming cholecystectomy  -Recommend holding Eliquis for 72 hours prior to procedure.  Defer to surgery to determine timing to restart but I suspect this should be sometime between 1 to 3 days following the procedure.  If there are complications or needs to be longer I would be happy to discuss further surgery.    RTC in 6 months    Nhan Norwood MD  Hematology/Medical Oncology  [facility]

## 2025-03-12 NOTE — PROGRESS NOTES
The following individual(s) verbally consented to be recorded using ambient AI listening technology and understand that they can each withdraw their consent to this listening technology at any point by asking the clinician to turn off or pause the recording:    Patient name: Shanae Wagner  Additional names:  n/a    Education Record     Learner:  Patient     Disease / Diagnosis: DVT/PE     Barriers / Limitations:  None                Comments:     Method:  Discussion                Comments:     General Topics:  Plan of care reviewed                Comments:     Outcome:  Shows understanding                Comments: 6 month f/up. Taking eliquis as directed without any complications. States she will be having gallbladder removed next week.

## 2025-03-21 PROCEDURE — 88304 TISSUE EXAM BY PATHOLOGIST: CPT | Performed by: SURGERY

## 2025-03-22 ENCOUNTER — LAB REQUISITION (OUTPATIENT)
Dept: LAB | Facility: HOSPITAL | Age: 65
End: 2025-03-22
Payer: COMMERCIAL

## 2025-03-22 DIAGNOSIS — K80.10 CALCULUS OF GALLBLADDER WITH CHRONIC CHOLECYSTITIS WITHOUT OBSTRUCTION: ICD-10-CM

## 2025-04-16 ENCOUNTER — OFFICE VISIT (OUTPATIENT)
Dept: INTERNAL MEDICINE CLINIC | Facility: CLINIC | Age: 65
End: 2025-04-16
Payer: COMMERCIAL

## 2025-04-16 ENCOUNTER — TELEPHONE (OUTPATIENT)
Dept: INTERNAL MEDICINE CLINIC | Facility: CLINIC | Age: 65
End: 2025-04-16

## 2025-04-16 VITALS
WEIGHT: 221 LBS | DIASTOLIC BLOOD PRESSURE: 84 MMHG | RESPIRATION RATE: 18 BRPM | SYSTOLIC BLOOD PRESSURE: 136 MMHG | BODY MASS INDEX: 33.49 KG/M2 | OXYGEN SATURATION: 97 % | HEART RATE: 72 BPM | TEMPERATURE: 97 F | HEIGHT: 67.99 IN

## 2025-04-16 DIAGNOSIS — Z13.228 SCREENING FOR METABOLIC DISORDER: ICD-10-CM

## 2025-04-16 DIAGNOSIS — Z13.29 SCREENING FOR THYROID DISORDER: ICD-10-CM

## 2025-04-16 DIAGNOSIS — I10 ESSENTIAL HYPERTENSION: Primary | ICD-10-CM

## 2025-04-16 DIAGNOSIS — Z00.00 ROUTINE GENERAL MEDICAL EXAMINATION AT A HEALTH CARE FACILITY: Primary | ICD-10-CM

## 2025-04-16 DIAGNOSIS — Z82.49 FAMILY HISTORY OF BLOOD CLOTS: ICD-10-CM

## 2025-04-16 DIAGNOSIS — I82.402 RECURRENT ACUTE DEEP VEIN THROMBOSIS (DVT) OF LEFT LOWER EXTREMITY (HCC): ICD-10-CM

## 2025-04-16 DIAGNOSIS — Z13.0 SCREENING FOR BLOOD DISEASE: ICD-10-CM

## 2025-04-16 DIAGNOSIS — Z13.220 SCREENING FOR LIPID DISORDERS: ICD-10-CM

## 2025-04-16 DIAGNOSIS — I82.4Y2 ACUTE DEEP VEIN THROMBOSIS (DVT) OF PROXIMAL VEIN OF LEFT LOWER EXTREMITY (HCC): ICD-10-CM

## 2025-04-16 PROCEDURE — 3075F SYST BP GE 130 - 139MM HG: CPT | Performed by: NURSE PRACTITIONER

## 2025-04-16 PROCEDURE — 99214 OFFICE O/P EST MOD 30 MIN: CPT | Performed by: NURSE PRACTITIONER

## 2025-04-16 PROCEDURE — 3079F DIAST BP 80-89 MM HG: CPT | Performed by: NURSE PRACTITIONER

## 2025-04-16 PROCEDURE — 3008F BODY MASS INDEX DOCD: CPT | Performed by: NURSE PRACTITIONER

## 2025-04-16 PROCEDURE — G2211 COMPLEX E/M VISIT ADD ON: HCPCS | Performed by: NURSE PRACTITIONER

## 2025-04-16 NOTE — TELEPHONE ENCOUNTER
Patient called request labs prior to their annual physical.  Annual physical scheduled for 6/9/25.   Please order labs. Patient preferred lab is [facility]  Patient informed request was sent to clinical team.  Patient informed to fast for labs.  No callback required.   Future Appointments   Date Time Provider Department Center   6/9/2025  8:00 AM Milagro Slaughter APRN [facility]

## 2025-04-16 NOTE — PROGRESS NOTES
The following individual(s) verbally consented to be recorded using ambient AI listening technology and understand that they can each withdraw their consent to this listening technology at any point by asking the clinician to turn off or pause the recording:    Patient name: Shanae Wagner  Additional names:            Shanae Wagner is a 64 year old female.    Chief Complaint   Patient presents with    Follow - Up     Rm 10 SS  Patient states her BP has been low.        HPI:     History of Present Illness  Shanae Wagner is a 64-year-old female with hypertension who presents with concerns of low blood pressure.    She has been experiencing low blood pressure, with a recent home reading of 96/63 mmHg. She stopped taking losartan because it was causing her blood pressure to drop too low. Despite attempting to take a 50 mg dose at home, her blood pressure remained low.  She experiences symptoms of dizziness and feeling 'woozy' when her blood pressure is low, particularly in the mornings. Her blood pressure issues began a little prior to her gallbladder surgery on March 21, 2025.  Her weight has decreased slightly, and her eating habits have changed, but there has not been a drastic change in her overall weight.     She is back on apixaban for her blood clots.  Due for Pap  Dr Jones has retired.  Will do at [facility] in June      Problem List[1]  Current Medications[2]   Past Medical History[3]   Social History:  Short Social Hx on File[4]  Family History[5]     Allergies  Allergies[6]    REVIEW OF SYSTEMS:   GENERAL HEALTH: feels well otherwise  RESPIRATORY: denies shortness of breath with exertion, no cough  CARDIOVASCULAR: denies chest pain on exertion, no palpatations  GI: denies abdominal pain and denies heartburn, no diarrhea or constipation  MUSCULOSKELETAL:  No arthralgias or myalgias  NEURO: denies headaches,     EXAM:   /84   Pulse 72   Temp 96.6 °F (35.9 °C) (Temporal)   Resp 18   Ht 5' 7.99\"  (1.727 m)   Wt 221 lb (100.2 kg)   LMP 02/13/2018 (Approximate)   SpO2 97%   BMI 33.61 kg/m²   GENERAL: well developed, well nourished,in no apparent distress  LUNGS: normal rate without respiratory distress, lungs clear to auscultation  CARDIO: RRR without murmur  GI: normal bowel sounds, no masses, HSM or tenderness  EXTREMITIES: no edema, normal strength and tone  PSYCH: alert and oriented x 3    ASSESSMENT AND PLAN:     Assessment & Plan  HTN  Reports low blood pressure readings at home, with a recent reading of 96/63 mmHg. Experiences dizziness and wooziness, particularly in the mornings. Low blood pressure readings have been present since before gallbladder surgery on March 21, 2025. Stopped taking losartan due to these low readings. Blood pressure is borderline high in the office today. - If home readings are consistently higher than 130 mmHg systolic, start losartan 25 mg using half of the 50 mg tablets available at home.  - Bring blood pressure cuff to the next appointment to ensure accuracy.  - Reassess blood pressure management at the upcoming physical in six weeks.    Hx DVT  cont apixaban            Encounter Diagnoses   Name Primary?    Essential hypertension Yes    Recurrent acute deep vein thrombosis (DVT) of left lower extremity (HCC)     Acute deep vein thrombosis (DVT) of proximal vein of left lower extremity (HCC)     Family history of blood clots     Code selection for this visit was based on time spent (>30min) on date of service in preparing to see the patient, obtaining and/or reviewing separately obtained history, performing a medically appropriate examination, counseling and educating the patient/family/caregiver, ordering medications or testing, referring and communicating with other healthcare providers, documenting clinical information in the EHR, independently interpreting results and communicating results to the patient/family/caregiver and care coordination with the patient's  other providers.      No orders of the defined types were placed in this encounter.      Meds & Refills for this Visit:  Requested Prescriptions      No prescriptions requested or ordered in this encounter       Imaging & Consults:  None    No follow-ups on file.  There are no Patient Instructions on file for this visit.       [1]   Patient Active Problem List  Diagnosis    Degeneration of lumbar or lumbosacral intervertebral disc    Essential hypertension    Rosacea    Multiple thyroid nodules    Migraines    Tremor    History of pulmonary embolism    History of DVT in adulthood    Family history of blood clots    Acute deep vein thrombosis (DVT) of proximal vein of lower extremity (HCC)    Recurrent acute deep vein thrombosis (DVT) of left lower extremity (HCC)    Pulmonary embolus (HCC)    Chronic idiopathic constipation    Intramural uterine fibroid    Pelvic pressure in female    Seasonal allergies    Chronic anticoagulation   [2]   Current Outpatient Medications   Medication Sig Dispense Refill    apixaban 2.5 MG Oral Tab Take 1 tablet (2.5 mg total) by mouth 2 (two) times daily. 180 tablet 2    triamcinolone 0.1 % External Cream Apply to affected area bid prn. 30 g 1    Losartan Potassium-HCTZ 100-12.5 MG Oral Tab Take 1 tablet by mouth daily. 90 tablet 3   [3]   Past Medical History:   Acute pharyngitis    Allergic rhinitis    Anemia    Normal now    History of blood clots    7-22    Leg DVT (deep venous thromboembolism), acute, left (HCC)    Personal history of pneumonia (recurrent)    Pulmonary embolism, bilateral (HCC)    Unspecified essential hypertension   [4]   Social History  Socioeconomic History    Marital status:    Tobacco Use    Smoking status: Never    Smokeless tobacco: Never   Vaping Use    Vaping status: Never Used   Substance and Sexual Activity    Alcohol use: Yes     Alcohol/week: 0.0 - 1.0 standard drinks of alcohol     Comment: cage 7/2/21, occasional, no hx of excessive use     Drug use: No    Sexual activity: Yes   Other Topics Concern    Caffeine Concern No    Stress Concern No    Weight Concern Yes    Special Diet No    Exercise Yes    Seat Belt Yes   Social History Narrative    . 2 adult kids, 3 grandkids.  Previously worked as an , quit 5/2022     Social Drivers of Health     Food Insecurity: No Food Insecurity (2/19/2025)    NCSS - Food Insecurity     Worried About Running Out of Food in the Last Year: No     Ran Out of Food in the Last Year: No   Transportation Needs: No Transportation Needs (2/19/2025)    NCSS - Transportation     Lack of Transportation: No   Housing Stability: Not At Risk (2/19/2025)    NCSS - Housing/Utilities     Has Housing: Yes     Worried About Losing Housing: No     Unable to Get Utilities: No   [5]   Family History  Problem Relation Age of Onset    Cancer Mother         breast    Breast Cancer Mother 65    DVT/VTE Mother 90        blood clot    Heart Disease Maternal Grandmother     DVT/VTE Maternal Grandmother 90        blood clot    Heart Disease Maternal Grandfather     Hypertension Father     Other (Other) Father    [6]   Allergies  Allergen Reactions    Shellfish NAUSEA AND VOMITING

## 2025-06-04 ENCOUNTER — LAB ENCOUNTER (OUTPATIENT)
Dept: LAB | Age: 65
End: 2025-06-04
Attending: NURSE PRACTITIONER
Payer: COMMERCIAL

## 2025-06-04 DIAGNOSIS — Z13.29 SCREENING FOR THYROID DISORDER: ICD-10-CM

## 2025-06-04 DIAGNOSIS — Z13.228 SCREENING FOR METABOLIC DISORDER: ICD-10-CM

## 2025-06-04 DIAGNOSIS — Z00.00 ROUTINE GENERAL MEDICAL EXAMINATION AT A HEALTH CARE FACILITY: ICD-10-CM

## 2025-06-04 DIAGNOSIS — Z13.0 SCREENING FOR BLOOD DISEASE: ICD-10-CM

## 2025-06-04 DIAGNOSIS — Z13.220 SCREENING FOR LIPID DISORDERS: ICD-10-CM

## 2025-06-04 LAB
ALBUMIN SERPL-MCNC: 4.5 G/DL (ref 3.2–4.8)
ALBUMIN/GLOB SERPL: 1.7 {RATIO} (ref 1–2)
ALP LIVER SERPL-CCNC: 69 U/L (ref 50–130)
ALT SERPL-CCNC: 31 U/L (ref 10–49)
ANION GAP SERPL CALC-SCNC: 8 MMOL/L (ref 0–18)
AST SERPL-CCNC: 30 U/L (ref ?–34)
BASOPHILS # BLD AUTO: 0.06 X10(3) UL (ref 0–0.2)
BASOPHILS NFR BLD AUTO: 1.1 %
BILIRUB SERPL-MCNC: 0.9 MG/DL (ref 0.2–1.1)
BUN BLD-MCNC: 11 MG/DL (ref 9–23)
CALCIUM BLD-MCNC: 10.1 MG/DL (ref 8.7–10.6)
CHLORIDE SERPL-SCNC: 100 MMOL/L (ref 98–112)
CHOLEST SERPL-MCNC: 160 MG/DL (ref ?–200)
CO2 SERPL-SCNC: 30 MMOL/L (ref 21–32)
CREAT BLD-MCNC: 0.99 MG/DL (ref 0.55–1.02)
EGFRCR SERPLBLD CKD-EPI 2021: 64 ML/MIN/1.73M2 (ref 60–?)
EOSINOPHIL # BLD AUTO: 0.1 X10(3) UL (ref 0–0.7)
EOSINOPHIL NFR BLD AUTO: 1.8 %
ERYTHROCYTE [DISTWIDTH] IN BLOOD BY AUTOMATED COUNT: 12.8 %
FASTING PATIENT LIPID ANSWER: YES
FASTING STATUS PATIENT QL REPORTED: YES
GLOBULIN PLAS-MCNC: 2.6 G/DL (ref 2–3.5)
GLUCOSE BLD-MCNC: 105 MG/DL (ref 70–99)
HCT VFR BLD AUTO: 46.1 % (ref 35–48)
HDLC SERPL-MCNC: 48 MG/DL (ref 40–59)
HGB BLD-MCNC: 16 G/DL (ref 12–16)
IMM GRANULOCYTES # BLD AUTO: 0.01 X10(3) UL (ref 0–1)
IMM GRANULOCYTES NFR BLD: 0.2 %
LDLC SERPL CALC-MCNC: 90 MG/DL (ref ?–100)
LYMPHOCYTES # BLD AUTO: 1.86 X10(3) UL (ref 1–4)
LYMPHOCYTES NFR BLD AUTO: 33.6 %
MCH RBC QN AUTO: 30.5 PG (ref 26–34)
MCHC RBC AUTO-ENTMCNC: 34.7 G/DL (ref 31–37)
MCV RBC AUTO: 88 FL (ref 80–100)
MONOCYTES # BLD AUTO: 0.54 X10(3) UL (ref 0.1–1)
MONOCYTES NFR BLD AUTO: 9.7 %
NEUTROPHILS # BLD AUTO: 2.97 X10 (3) UL (ref 1.5–7.7)
NEUTROPHILS # BLD AUTO: 2.97 X10(3) UL (ref 1.5–7.7)
NEUTROPHILS NFR BLD AUTO: 53.6 %
NONHDLC SERPL-MCNC: 112 MG/DL (ref ?–130)
OSMOLALITY SERPL CALC.SUM OF ELEC: 286 MOSM/KG (ref 275–295)
PLATELET # BLD AUTO: 280 10(3)UL (ref 150–450)
POTASSIUM SERPL-SCNC: 4.1 MMOL/L (ref 3.5–5.1)
PROT SERPL-MCNC: 7.1 G/DL (ref 5.7–8.2)
RBC # BLD AUTO: 5.24 X10(6)UL (ref 3.8–5.3)
SODIUM SERPL-SCNC: 138 MMOL/L (ref 136–145)
TRIGL SERPL-MCNC: 120 MG/DL (ref 30–149)
TSI SER-ACNC: 1.31 UIU/ML (ref 0.55–4.78)
VLDLC SERPL CALC-MCNC: 19 MG/DL (ref 0–30)
WBC # BLD AUTO: 5.5 X10(3) UL (ref 4–11)

## 2025-06-04 PROCEDURE — 80061 LIPID PANEL: CPT | Performed by: NURSE PRACTITIONER

## 2025-06-04 PROCEDURE — 80050 GENERAL HEALTH PANEL: CPT | Performed by: NURSE PRACTITIONER

## 2025-06-09 ENCOUNTER — OFFICE VISIT (OUTPATIENT)
Dept: INTERNAL MEDICINE CLINIC | Facility: CLINIC | Age: 65
End: 2025-06-09
Payer: COMMERCIAL

## 2025-06-09 VITALS
OXYGEN SATURATION: 97 % | BODY MASS INDEX: 34.43 KG/M2 | RESPIRATION RATE: 16 BRPM | SYSTOLIC BLOOD PRESSURE: 134 MMHG | WEIGHT: 219.38 LBS | HEIGHT: 67 IN | DIASTOLIC BLOOD PRESSURE: 84 MMHG | HEART RATE: 71 BPM | TEMPERATURE: 98 F

## 2025-06-09 DIAGNOSIS — M51.379 DEGENERATION OF INTERVERTEBRAL DISC OF LUMBOSACRAL REGION WITHOUT DISCOGENIC PAIN OR LOWER EXTREMITY PAIN: ICD-10-CM

## 2025-06-09 DIAGNOSIS — J30.2 SEASONAL ALLERGIES: ICD-10-CM

## 2025-06-09 DIAGNOSIS — L71.9 ROSACEA: ICD-10-CM

## 2025-06-09 DIAGNOSIS — Z12.31 ENCOUNTER FOR SCREENING MAMMOGRAM FOR MALIGNANT NEOPLASM OF BREAST: ICD-10-CM

## 2025-06-09 DIAGNOSIS — Z79.01 CHRONIC ANTICOAGULATION: ICD-10-CM

## 2025-06-09 DIAGNOSIS — I82.402 RECURRENT ACUTE DEEP VEIN THROMBOSIS (DVT) OF LEFT LOWER EXTREMITY (HCC): ICD-10-CM

## 2025-06-09 DIAGNOSIS — G43.001 MIGRAINE WITHOUT AURA AND WITH STATUS MIGRAINOSUS, NOT INTRACTABLE: ICD-10-CM

## 2025-06-09 DIAGNOSIS — I27.82 OTHER CHRONIC PULMONARY EMBOLISM WITHOUT ACUTE COR PULMONALE (HCC): ICD-10-CM

## 2025-06-09 DIAGNOSIS — Z82.49 FAMILY HISTORY OF BLOOD CLOTS: ICD-10-CM

## 2025-06-09 DIAGNOSIS — I10 ESSENTIAL HYPERTENSION: ICD-10-CM

## 2025-06-09 DIAGNOSIS — D25.1 INTRAMURAL UTERINE FIBROID: ICD-10-CM

## 2025-06-09 DIAGNOSIS — Z00.00 ROUTINE GENERAL MEDICAL EXAMINATION AT A HEALTH CARE FACILITY: Primary | ICD-10-CM

## 2025-06-09 DIAGNOSIS — E04.2 MULTIPLE THYROID NODULES: ICD-10-CM

## 2025-06-09 PROBLEM — K59.04 CHRONIC IDIOPATHIC CONSTIPATION: Status: RESOLVED | Noted: 2024-03-26 | Resolved: 2025-06-09

## 2025-06-09 PROBLEM — R10.2 PELVIC PRESSURE IN FEMALE: Status: RESOLVED | Noted: 2024-03-26 | Resolved: 2025-06-09

## 2025-06-09 PROBLEM — R25.1 TREMOR: Status: RESOLVED | Noted: 2020-06-03 | Resolved: 2025-06-09

## 2025-06-09 PROBLEM — I82.4Y9 ACUTE DEEP VEIN THROMBOSIS (DVT) OF PROXIMAL VEIN OF LOWER EXTREMITY (HCC): Status: RESOLVED | Noted: 2023-12-18 | Resolved: 2025-06-09

## 2025-06-09 RX ORDER — LOSARTAN POTASSIUM AND HYDROCHLOROTHIAZIDE 12.5; 5 MG/1; MG/1
1 TABLET ORAL DAILY
Qty: 90 TABLET | Refills: 3 | Status: SHIPPED | OUTPATIENT
Start: 2025-06-09 | End: 2026-06-04

## 2025-06-09 RX ORDER — MONTELUKAST SODIUM 10 MG/1
10 TABLET ORAL DAILY
Qty: 90 TABLET | Refills: 3 | Status: SHIPPED | OUTPATIENT
Start: 2025-06-09 | End: 2026-06-04

## 2025-06-09 NOTE — PROGRESS NOTES
The following individual(s) verbally consented to be recorded using ambient AI listening technology and understand that they can each withdraw their consent to this listening technology at any point by asking the clinician to turn off or pause the recording:    Patient name: Shanae Wagner  Additional names:            Shanae Wagner is a 64 year old female who presents for a complete physical exam:       Patient complains of:     History of Present Illness  Shanae Wagner is a 64 year old female who presents for an annual physical exam.    Hypertension is well-controlled with losartan HCT 50/12.5 mg, with home blood pressure readings consistently between 110 to 120 mmHg systolic. After her cholecystectomy in March, she experienced low blood pressure and temporarily stopped her medication, but her blood pressure has since stabilized on the current dose.    In March, she underwent a cholecystectomy. An abdominal ultrasound showed increased hepatic echogenicity consistent with fatty liver, debris in the gallbladder, and renal cysts.  Diet, exercise, and lifestyle modification.      Glucose level is slightly elevated at 105 mg/dL. She attends the gym almost daily to walk, avoiding outdoor walking due to allergies and bad weather.    Thyroid nodules are being monitored, with a new nodule identified. She plans to follow up next year to assess its growth.    Seasonal allergies are bothersome. She takes Xyzal daily and montelukast as prescribed, and uses Coricidin as needed for high blood pressure, which she finds helpful.    Wt Readings from Last 6 Encounters:   06/09/25 219 lb 6.4 oz (99.5 kg)   04/16/25 221 lb (100.2 kg)   03/12/25 221 lb 9.6 oz (100.5 kg)   02/19/25 223 lb 12.8 oz (101.5 kg)   09/11/24 221 lb (100.2 kg)   05/20/24 223 lb 12.8 oz (101.5 kg)       Results  LABS  Glucose: 105 mg/dL  LDL cholesterol: 90 mg/dL    RADIOLOGY  Bone density: T score of 2.3 (Summer 2024)  Abdominal ultrasound: Increased  hepatic echogenicity consistent with hepatic steatosis; debris in the gallbladder; renal cysts (March 2025)      Current Medications[1]   Past Medical History[2]   Past Surgical History[3]   Family History[4]        Health Maintenance  Immunizations: defers  Immunization History   Administered Date(s) Administered    TD 03/29/2006    TDAP 02/28/2017     Dental Visits: yes   Colon cancer screening:    Health Maintenance   Topic Date Due    Colorectal Cancer Screening  07/11/2027      Gyne:     Health Maintenance   Topic Date Due    Pap Smear  03/02/2025            History of dysplasia:  No  Menstrual Cycle: post.        Breast Cancer Screening:    Health Maintenance   Topic Date Due    Mammogram  08/24/2025        Bone Density:  normal 2024    Osteoporosis Prevention:    Osteoporosis Prevention was discussed.  Reviewed Calcium, Vitamin D supplementation and Weight Bearing Exercises.    Patient is performing weight bearing exercises.  Patient is currently taking Vitamin D supplementation.        REVIEW OF SYSTEMS:   GENERAL: feels well otherwise  SKIN: denies any unusual skin lesions  EYES:denies blurred vision or double vision  HEENT: denies nasal congestion, sinus pain or ST  LUNGS: denies shortness of breath with exertion  CARDIOVASCULAR: denies chest pain on exertion  GI: denies abdominal pain,denies heartburn  : denies dysuria, vaginal discharge or itching  MUSCULOSKELETAL: denies back pain  NEURO: denies headaches  PSYCH: no c/o      EXAM:   /84   Pulse 71   Temp 97.9 °F (36.6 °C) (Temporal)   Resp 16   Ht 5' 7\" (1.702 m)   Wt 219 lb 6.4 oz (99.5 kg)   LMP 02/13/2018 (Approximate)   SpO2 97%   BMI 34.36 kg/m²   Body mass index is 34.36 kg/m².   GENERAL: well developed, well nourished,in no apparent distress  SKIN: no rashes,no suspicious lesions  HEENT: atraumatic, normocephalic,ears and throat are clear  EYES:PERRLA, EOMI, conjunctiva are clear  NECK: supple,no adenopathy,  LUNGS: clear to  auscultation  CARDIO: RRR without murmur  GI: good BS's,no masses, HSM or tenderness  MUSCULOSKELETAL: back is not tender,  EXTREMITIES: no edema  NEURO: Oriented times three,cranial nerves are intact,motor and sensory are grossly intact    ASSESSMENT AND PLAN:      HEALTH MAINTENANCE:  labs reviewed.  She prefers to continue to see gyne for well woman care.  Mammogram in August.      Assessment & Plan  Essential Hypertension  Hypertension well-controlled on losartan HCT 50/12.5 mg. Current dose effective and well-tolerated.  - Continue losartan HCT 50/12.5 mg daily.  - Refill prescription for losartan HCT.    Seasonal Allergies  Allergies managed with Xyzal and montelukast. Coricidin used as needed.  - Continue Xyzal daily.  - Refill montelukast prescription.  - Use Coricidin as needed.    Hepatic Steatosis  Ultrasound shows increased hepatic echogenicity. Emphasized weight loss, diet, and exercise.  - Monitor liver function tests.  - Encourage weight loss through diet and exercise.    Thyroid Nodules  Thyroid nodules monitored by Dr. Bailee Rucker. New nodule identified, follow-up planned for next year.  - Continue follow-up with Dr. Bailee Rucker.    General Health Maintenance  Discussed exercise and weight management. Bone density excellent with T-score of 2.3.  - Encourage regular exercise and weight management.    DVT/PE  Dr Norwood.  Dino.     Migraines  stable            Encounter Diagnoses   Name Primary?    Routine general medical examination at a health care facility Yes    Encounter for screening mammogram for malignant neoplasm of breast     Other chronic pulmonary embolism without acute cor pulmonale (HCC)     Recurrent acute deep vein thrombosis (DVT) of left lower extremity (HCC)     Essential hypertension     Family history of blood clots     Multiple thyroid nodules     Degeneration of intervertebral disc of lumbosacral region without discogenic pain or lower extremity pain     Migraine without aura  and with status migrainosus, not intractable     Intramural uterine fibroid     Chronic anticoagulation     Seasonal allergies     Rosacea         No orders of the defined types were placed in this encounter.      Meds & Refills for this Visit:  Requested Prescriptions     Signed Prescriptions Disp Refills    losartan-hydroCHLOROthiazide 50-12.5 MG Oral Tab 90 tablet 3     Sig: Take 1 tablet by mouth daily.    montelukast (SINGULAIR) 10 MG Oral Tab 90 tablet 3     Sig: Take 1 tablet (10 mg total) by mouth daily.       Imaging & Consults:  [facility] JASMINA 2D+3D SCREENING BILAT (CPT=77067/98465)    No follow-ups on file.  There are no Patient Instructions on file for this visit.             [1]   Current Outpatient Medications   Medication Sig Dispense Refill    losartan-hydroCHLOROthiazide 50-12.5 MG Oral Tab Take 1 tablet by mouth daily. 90 tablet 3    montelukast (SINGULAIR) 10 MG Oral Tab Take 1 tablet (10 mg total) by mouth daily. 90 tablet 3    apixaban 2.5 MG Oral Tab Take 1 tablet (2.5 mg total) by mouth 2 (two) times daily. 180 tablet 2    triamcinolone 0.1 % External Cream Apply to affected area bid prn. 30 g 1   [2]   Past Medical History:   Acute pharyngitis    Allergic rhinitis    Anemia    Normal now    History of blood clots    7-22    Leg DVT (deep venous thromboembolism), acute, left (HCC)    Personal history of pneumonia (recurrent)    Pulmonary embolism, bilateral (HCC)    Unspecified essential hypertension   [3]   Past Surgical History:  Procedure Laterality Date    Colonoscopy N/A 07/11/2017    Procedure: COLONOSCOPY, POSSIBLE BIOPSY, POSSIBLE POLYPECTOMY 06293;  Surgeon: Kerwin Velasquez MD;  Location: [facility]    Colonoscopy,biopsy  07/27/2012    2 small cecal adenomatous polyps, Procedure: ESOPHAGOGASTRODUODENOSCOPY, COLONOSCOPY, POSSIBLE BIOPSY, POSSIBLE POLYPECTOMY 69831,[id number];  Surgeon: Kerwin Velasquez MD;  Location: [facility]    Colonoscopy,diagnostic   2017    normal    Endometr ablate, thermal        6-15[birth year]87    I’m    Upper gi endoscopy,diagnosis  2012    wnl, Procedure: ESOPHAGOGASTRODUODENOSCOPY, COLONOSCOPY, POSSIBLE BIOPSY, POSSIBLE POLYPECTOMY 40149,35108;  Surgeon: Kerwin Velasquez MD;  Location: [facility], [facility]   [4]   Family History  Problem Relation Age of Onset    Cancer Mother         breast    Breast Cancer Mother 65    DVT/VTE Mother 90        blood clot    Heart Disease Maternal Grandmother     DVT/VTE Maternal Grandmother 90        blood clot    Heart Disease Maternal Grandfather     Hypertension Father     Other (Other) Father

## 2025-08-06 ENCOUNTER — HOSPITAL ENCOUNTER (OUTPATIENT)
Age: 65
Discharge: HOME | End: 2025-08-06

## 2025-08-06 ENCOUNTER — NURSE TRIAGE (OUTPATIENT)
Dept: INTERNAL MEDICINE CLINIC | Facility: CLINIC | Age: 65
End: 2025-08-06

## 2025-08-06 VITALS
OXYGEN SATURATION: 95 % | WEIGHT: 215 LBS | HEIGHT: 66.5 IN | SYSTOLIC BLOOD PRESSURE: 118 MMHG | HEART RATE: 73 BPM | TEMPERATURE: 99 F | DIASTOLIC BLOOD PRESSURE: 75 MMHG | BODY MASS INDEX: 34.14 KG/M2 | RESPIRATION RATE: 16 BRPM

## 2025-08-06 DIAGNOSIS — L03.119 CELLULITIS OF LOWER EXTREMITY, UNSPECIFIED LATERALITY: Primary | ICD-10-CM

## 2025-08-06 PROCEDURE — 99213 OFFICE O/P EST LOW 20 MIN: CPT

## 2025-08-06 RX ORDER — TRIAMCINOLONE ACETONIDE 1 MG/G
1 OINTMENT TOPICAL 2 TIMES DAILY
Qty: 30 G | Refills: 0 | Status: SHIPPED | OUTPATIENT
Start: 2025-08-06

## 2025-08-06 RX ORDER — DOXYCYCLINE 100 MG/1
100 CAPSULE ORAL 2 TIMES DAILY
Qty: 20 CAPSULE | Refills: 0 | Status: SHIPPED | OUTPATIENT
Start: 2025-08-06 | End: 2025-08-16

## (undated) NOTE — Clinical Note
Pt has HFU appt set for 6/30/22. Pt reports she is doing better. Medication list reviewed. Thank you.

## (undated) NOTE — LETTER
Date & Time: 1/2/2023, 8:49 AM  Patient: Yana Mcfarland  Encounter Provider(s):    Rashad Beauchamp PA-C       To Whom It May Concern:    Tal Palomares was seen and treated in our department on 1/2/2023.   She should not return to work until 1/5/2023  If you have any questions or concerns, please do not hesitate to call.        _____________________________  Physician/APC Signature

## (undated) NOTE — LETTER
08/04/21        [address] [place] [id number]      Dear Garth Casarez,    Our records indicate that you have outstanding lab work and or testing that was ordered for you and has not yet been completed:  Orders Placed This Encounter

## (undated) NOTE — MR AVS SNAPSHOT
[facility] [address] [address] [phone]               Thank you for choosing us for your health care visit with Danny Alcocer NP.   We are glad to serve you and happy to provide you with this summary Do two sprays each nostril and gently blow nose after. Also, may use Ocean Nasal spray during the day while at work, school, in between full sinus rinses.  Still try to do full nasal wash at least 3 times daily (morning before work, after work and befor Enjoy your food, but eat less. Fully enjoy your food when eating. Don’t eat while distracted and slow down. Avoid over sized portions. Don’t eat while when you’re bored.      EAT THESE FOODS MORE OFTEN: EAT THESE FOODS LESS OFTEN:   Make half your pl

## (undated) NOTE — MR AVS SNAPSHOT
[facility] [address] [phone]               Thank you for choosing us for your health care visit with STEVE Cochran.   We are glad to serve you and happy to provide you with this summar Commonly known as:  RETIN-A                Where to Get Your Medications      These medications were sent to [facility]  [facility], [address] [address] AT 1 [facility], [phone], 4 [facility] Mina Morrissey